# Patient Record
Sex: FEMALE | Race: WHITE | Employment: FULL TIME | URBAN - NONMETROPOLITAN AREA
[De-identification: names, ages, dates, MRNs, and addresses within clinical notes are randomized per-mention and may not be internally consistent; named-entity substitution may affect disease eponyms.]

---

## 2019-12-12 ENCOUNTER — OFFICE VISIT (OUTPATIENT)
Dept: OPTOMETRY | Age: 49
End: 2019-12-12
Payer: COMMERCIAL

## 2019-12-12 DIAGNOSIS — H52.4 MYOPIA OF BOTH EYES WITH ASTIGMATISM AND PRESBYOPIA: Primary | ICD-10-CM

## 2019-12-12 DIAGNOSIS — H52.13 MYOPIA OF BOTH EYES WITH ASTIGMATISM AND PRESBYOPIA: Primary | ICD-10-CM

## 2019-12-12 DIAGNOSIS — H52.203 MYOPIA OF BOTH EYES WITH ASTIGMATISM AND PRESBYOPIA: Primary | ICD-10-CM

## 2019-12-12 PROCEDURE — 92004 COMPRE OPH EXAM NEW PT 1/>: CPT | Performed by: OPTOMETRIST

## 2019-12-12 ASSESSMENT — REFRACTION_MANIFEST
OS_SPHERE: -1.75
OS_AXIS: 091
OD_ADD: +1.75
OS_ADD: +1.75
OS_CYLINDER: -0.50
OD_SPHERE: -1.75
OD_CYLINDER: DS

## 2019-12-12 ASSESSMENT — REFRACTION_WEARINGRX
OS_ADD: +1.50
OD_CYLINDER: DS
OD_ADD: +1.50
OS_SPHERE: -2.25
SPECS_TYPE: PAL
OS_CYLINDER: -0.25
OD_SPHERE: -2.25
OS_AXIS: 103

## 2019-12-12 ASSESSMENT — SLIT LAMP EXAM - LIDS
COMMENTS: NORMAL
COMMENTS: NORMAL

## 2019-12-12 ASSESSMENT — TONOMETRY
OD_IOP_MMHG: 16
OS_IOP_MMHG: 19
IOP_METHOD: NON-CONTACT AIR PUFF

## 2019-12-12 ASSESSMENT — VISUAL ACUITY
OD_CC: 20/40 OU
CORRECTION_TYPE: GLASSES
OS_CC: 20/25
METHOD: SNELLEN - LINEAR

## 2023-02-16 ENCOUNTER — APPOINTMENT (OUTPATIENT)
Dept: GENERAL RADIOLOGY | Age: 53
End: 2023-02-16
Payer: COMMERCIAL

## 2023-02-16 ENCOUNTER — HOSPITAL ENCOUNTER (EMERGENCY)
Age: 53
Discharge: HOME | End: 2023-02-16
Attending: EMERGENCY MEDICINE
Payer: COMMERCIAL

## 2023-02-16 VITALS
OXYGEN SATURATION: 98 % | RESPIRATION RATE: 18 BRPM | SYSTOLIC BLOOD PRESSURE: 145 MMHG | TEMPERATURE: 98 F | DIASTOLIC BLOOD PRESSURE: 107 MMHG | HEART RATE: 97 BPM | HEIGHT: 64 IN | BODY MASS INDEX: 48.14 KG/M2 | WEIGHT: 282 LBS

## 2023-02-16 DIAGNOSIS — R00.0 TACHYCARDIA: Primary | ICD-10-CM

## 2023-02-16 LAB
ALBUMIN SERPL BCG-MCNC: 4.2 G/DL (ref 3.5–5.1)
ALP SERPL-CCNC: 84 U/L (ref 38–126)
ALT SERPL W/O P-5'-P-CCNC: 24 U/L (ref 11–66)
ANION GAP SERPL CALC-SCNC: 11 MEQ/L (ref 8–16)
AST SERPL-CCNC: 18 U/L (ref 5–40)
BASOPHILS ABSOLUTE: 0 THOU/MM3 (ref 0–0.1)
BASOPHILS NFR BLD AUTO: 0.5 %
BILIRUB CONJ SERPL-MCNC: < 0.2 MG/DL (ref 0–0.3)
BILIRUB SERPL-MCNC: 0.4 MG/DL (ref 0.3–1.2)
BUN SERPL-MCNC: 11 MG/DL (ref 7–22)
CALCIUM SERPL-MCNC: 9.2 MG/DL (ref 8.5–10.5)
CHLORIDE SERPL-SCNC: 103 MEQ/L (ref 98–111)
CO2 SERPL-SCNC: 24 MEQ/L (ref 23–33)
CREAT SERPL-MCNC: 0.6 MG/DL (ref 0.4–1.2)
DEPRECATED RDW RBC AUTO: 40.5 FL (ref 35–45)
EOSINOPHIL NFR BLD AUTO: 2.1 %
EOSINOPHILS ABSOLUTE: 0.1 THOU/MM3 (ref 0–0.4)
ERYTHROCYTE [DISTWIDTH] IN BLOOD BY AUTOMATED COUNT: 13.1 % (ref 11.5–14.5)
GFR SERPL CREATININE-BSD FRML MDRD: > 60 ML/MIN/1.73M2
GLUCOSE SERPL-MCNC: 105 MG/DL (ref 70–108)
HCT VFR BLD AUTO: 43.3 % (ref 37–47)
HGB BLD-MCNC: 14.5 GM/DL (ref 12–16)
IMM GRANULOCYTES # BLD AUTO: 0.02 THOU/MM3 (ref 0–0.07)
IMM GRANULOCYTES NFR BLD AUTO: 0.3 %
LIPASE SERPL-CCNC: 20.6 U/L (ref 5.6–51.3)
LYMPHOCYTES ABSOLUTE: 2.3 THOU/MM3 (ref 1–4.8)
LYMPHOCYTES NFR BLD AUTO: 39.6 %
MAGNESIUM SERPL-MCNC: 1.8 MG/DL (ref 1.6–2.4)
MCH RBC QN AUTO: 28.3 PG (ref 26–33)
MCHC RBC AUTO-ENTMCNC: 33.5 GM/DL (ref 32.2–35.5)
MCV RBC AUTO: 84.6 FL (ref 81–99)
MONOCYTES ABSOLUTE: 0.4 THOU/MM3 (ref 0.4–1.3)
MONOCYTES NFR BLD AUTO: 6.2 %
NEUTROPHILS NFR BLD AUTO: 51.3 %
NRBC BLD AUTO-RTO: 0 /100 WBC
NT-PROBNP SERPL IA-MCNC: < 36 PG/ML (ref 0–124)
OSMOLALITY SERPL CALC.SUM OF ELEC: 275.4 MOSMOL/KG (ref 275–300)
PLATELET # BLD AUTO: 264 THOU/MM3 (ref 130–400)
PMV BLD AUTO: 9.5 FL (ref 9.4–12.4)
POTASSIUM SERPL-SCNC: 3.6 MEQ/L (ref 3.5–5.2)
PROT SERPL-MCNC: 7.4 G/DL (ref 6.1–8)
RBC # BLD AUTO: 5.12 MILL/MM3 (ref 4.2–5.4)
SEGMENTED NEUTROPHILS ABSOLUTE COUNT: 3 THOU/MM3 (ref 1.8–7.7)
SODIUM SERPL-SCNC: 138 MEQ/L (ref 135–145)
TROPONIN T: < 0.01 NG/ML
WBC # BLD AUTO: 5.8 THOU/MM3 (ref 4.8–10.8)

## 2023-02-16 PROCEDURE — 83735 ASSAY OF MAGNESIUM: CPT

## 2023-02-16 PROCEDURE — 83880 ASSAY OF NATRIURETIC PEPTIDE: CPT

## 2023-02-16 PROCEDURE — 85025 COMPLETE CBC W/AUTO DIFF WBC: CPT

## 2023-02-16 PROCEDURE — 99285 EMERGENCY DEPT VISIT HI MDM: CPT

## 2023-02-16 PROCEDURE — 93005 ELECTROCARDIOGRAM TRACING: CPT | Performed by: EMERGENCY MEDICINE

## 2023-02-16 PROCEDURE — 83690 ASSAY OF LIPASE: CPT

## 2023-02-16 PROCEDURE — 71045 X-RAY EXAM CHEST 1 VIEW: CPT

## 2023-02-16 PROCEDURE — 84484 ASSAY OF TROPONIN QUANT: CPT

## 2023-02-16 PROCEDURE — 36415 COLL VENOUS BLD VENIPUNCTURE: CPT

## 2023-02-16 PROCEDURE — 80053 COMPREHEN METABOLIC PANEL: CPT

## 2023-02-16 PROCEDURE — 82248 BILIRUBIN DIRECT: CPT

## 2023-02-16 NOTE — ED PROVIDER NOTES
[facility] [address] [address] EMERGENCY DEPT      EMERGENCY MEDICINE     Pt Name: Julia Morales  MRN: [id number]  [place] 1970  Date of evaluation: 2/16/2023  Resident Physician: Cindy Matias DPM  Supervising Physician: Abena Rosales DO    CHIEF COMPLAINT       Chief Complaint   Patient presents with    Palpitations     HISTORY OF PRESENT ILLNESS   Julia Morales is a pleasant 47 y.o. female who presents to the emergency department from from home, as a walk in to the ED lobby for evaluation of heart palpitations. Patient relates that she is had palpitations her whole life without any significant comorbid symptoms and that they would savita after a day or so. She relates that since this past Sunday, approximately 4 days ago, she began having consistent palpitations with severe pain during them. She admits that when they occur she gets slightly dizzy but has not passed out. She denies any nausea, vomiting, consistent chest pain, consistent shortness of breath, diarrhea. She denies any illness in recent memory. She relates that she has not taken anything for this issue nor seen a doctor for it, and has not seen a physician since having COVID 2-3 years ago. 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 PASTMEDICAL HISTORY     Past Medical History:   Diagnosis Date    Myopia        Patient Active Problem List   Diagnosis Code    Myopia of both eyes with astigmatism H52.13, H52.203     SURGICAL HISTORY     History reviewed. No pertinent surgical history. CURRENT MEDICATIONS       Previous Medications    No medications on file       ALLERGIES     is allergic to penicillins. FAMILY HISTORY     She indicated that the status of her neg hx is unknown.        SOCIAL HISTORY       Social History     Tobacco Use    Smoking status: Never    Smokeless tobacco: Never       PHYSICAL EXAM       ED Triage Vitals   BP Temp Temp src Pulse Resp SpO2 Height Weight   -- -- -- -- -- -- -- --       Additional Vital Signs:  Vitals:    02/16/23 1408   BP: (!) 145/107   Pulse: 97   Resp: 18   Temp: 98 °F (36.7 °C)   SpO2: 98%     Physical Exam  Vitals and nursing note reviewed. Constitutional:       General: She is not in acute distress. Appearance: Normal appearance. She is obese. She is not ill-appearing. HENT:      Head: Normocephalic. Eyes:      Extraocular Movements: Extraocular movements intact. Pupils: Pupils are equal, round, and reactive to light. Cardiovascular:      Rate and Rhythm: Regular rhythm. Tachycardia present. Pulses: Normal pulses. Pulmonary:      Effort: Pulmonary effort is normal.      Breath sounds: Normal breath sounds. Abdominal:      Palpations: Abdomen is soft. Tenderness: There is no abdominal tenderness. Musculoskeletal:      Right lower leg: Edema present. Left lower leg: Edema present. Skin:     General: Skin is warm and dry. Capillary Refill: Capillary refill takes less than 2 seconds. Neurological:      General: No focal deficit present. Mental Status: She is alert and oriented to person, place, and time. Psychiatric:         Attention and Perception: Attention normal.         Mood and Affect: Mood is anxious. Speech: Speech normal.         Behavior: Behavior is cooperative. Thought Content: Thought content normal.       FORMAL DIAGNOSTIC RESULTS     RADIOLOGY: Interpretation per the Radiologist below, if available at the time of this note (none if blank):    XR CHEST PORTABLE   Final Result   Normal mobile chest.            **This report has been created using voice recognition software. It may contain minor errors which are inherent in voice recognition technology. **      Final report electronically signed by Dr. Noemi Muñiz on 2/16/2023 2:27 PM          LABS: (none if blank)  Labs Reviewed   CBC WITH AUTO DIFFERENTIAL   BRAIN NATRIURETIC PEPTIDE   BASIC METABOLIC PANEL   HEPATIC FUNCTION PANEL   LIPASE   TROPONIN   MAGNESIUM   ANION GAP   GLOMERULAR FILTRATION RATE, ESTIMATED   OSMOLALITY       (Any cultures that may have been sent were not resulted at the time of this patient visit)    [address] / ED COURSE:     1) Number and Complexity of Problems            Problem List This Visit:         Chief Complaint   Patient presents with    Palpitations            Differential Diagnosis includes (but not limited to): Anxiety induced palpitations,        Diagnoses Considered but I have low suspicion of:   ACS, A-fib, a flutter             Pertinent Comorbid Conditions:        2)  Data Reviewed (none if left blank)          My Independent interpretations:     EKG:      NSR    Imaging: CXR    Labs:      CBC, BMP, BMP, hepatic, lipase, troponin, mag                 Decision Rules/Clinical Scores utilized:              External Documentation Reviewed:         Previous patient encounter documents & history available on EMR was reviewed. See Formal Diagnostic Results above for the lab and radiology tests and orders. 3)  Treatment and Disposition         ED Reassessment: Upon reassessment patient was sitting in cot no acute distress. Had not had ID acute palpitations since initial examination. Did not describe pain as \"chest pain\" but more of a getting the wind knocked out of her. Case discussed with consulting clinician:           Shared Decision-Making was performed and disposition discussed with the        Patient/Family and questions answered          Social determinants of health impacting treatment or disposition:           Code Status:  Not on file. Summary of Patient Presentation:    Medical Decision Making  This 60-year-old female patient presented to the emergency department with chief complaint of palpitations.   She related that she has had palpitations her entire life, and that they would savita after a day or so. She relates that for the past 4 days the episodes are making her feel like she got the wind knocked out of her and that they are not abating. EKG showed normal sinus rhythm. Chest x-ray no acute findings. Auscultation of heart and lungs are unremarkable. Patient sustained a heart rate above 90 for the entire emergency department stay. Patient did relate recent events in her life that could be considered stressors, with consideration given to anxiety. All labs drawn were unremarkable for any acute cardiac findings. Due to this and patient being stable, persistent tachycardia was suspected and she was given a prescription for metoprolol 25 mg p.o. twice daily for 30 days with instructions to follow-up with cardiology (Dr. Abel Cameron) within 2-3 days, as well as follow-up with primary care within 1 week. She may return to the emergency department if symptoms worsen. /  ED Course as of 02/16/23 1519   Thu Feb 16, 2023   1456 Chest x-ray returned; normal chest, no acute findings. [AA]      ED Course User Index  [AA] Amilcar Henry, DPM     Vitals Reviewed:    Vitals:    02/16/23 1408   BP: (!) 145/107   Pulse: 97   Resp: 18   Temp: 98 °F (36.7 °C)   SpO2: 98%   Weight: 282 lb (127.9 kg)   Height: 5' 4\" (1.626 m)       The patient was seen and examined. Appropriate diagnostic testing was performed and results reviewed with the patient. The results of pertinent diagnostic studies and exam findings were discussed. The patients provisional diagnosis and plan of care were discussed with the patient and present family who expressed understanding. Any medications were reviewed and indications and risks of medications were discussed with the patient /family present. Strict verbal and written return precautions, instructions and appropriate follow-up provided to  the patient.      ED Medications administered this visit:  (None if blank)  Medications - No data to display      PROCEDURES: (None if blank)  Procedures:     CRITICAL CARE: (None if blank)      DISCHARGE PRESCRIPTIONS: (None if blank)  New Prescriptions    METOPROLOL TARTRATE (LOPRESSOR) 25 MG TABLET    Take 1 tablet by mouth 2 times daily       FINAL IMPRESSION      1.  Tachycardia          DISPOSITION/PLAN   DISPOSITION Decision To Discharge 02/16/2023 03:12:07 PM        OUTPATIENT FOLLOW UP THE PATIENT:  Mary Ellen Horton MD  [address] [place] [address]  [phone]    Schedule an appointment as soon as possible for a visit in 2 days      [address] [address]  [phone]  Go to   If symptoms worsen    Barry Pepper MD  [address] [address]  [phone]    Schedule an appointment as soon as possible for a visit in 1 week      Saad Ruiz [address] [place] [address]  02/16/23 3801

## 2023-02-16 NOTE — ED TRIAGE NOTES
Patient to room 16 from triage for complaint of worsening palpitations. Patient states that she has been getting these for her whole life. Patient states that she has not followed with cardiology for approximately 20 years. EKG completed, Dr. Ken Pantoja at bedside.

## 2023-02-16 NOTE — DISCHARGE INSTRUCTIONS
You were seen in the emergency department for palpitations. Take all prescribed medications as directed. Contact Dr. Vijaya Palma within the next 2-3 days for follow-up care and management. Return to ED if symptoms worsen.

## 2023-02-17 LAB
EKG ATRIAL RATE: 93 BPM
EKG P AXIS: 68 DEGREES
EKG P-R INTERVAL: 164 MS
EKG Q-T INTERVAL: 366 MS
EKG QRS DURATION: 84 MS
EKG QTC CALCULATION (BAZETT): 455 MS
EKG R AXIS: 0 DEGREES
EKG T AXIS: 31 DEGREES
EKG VENTRICULAR RATE: 93 BPM

## 2023-02-17 PROCEDURE — 93010 ELECTROCARDIOGRAM REPORT: CPT | Performed by: INTERNAL MEDICINE

## 2023-03-17 ENCOUNTER — HOSPITAL ENCOUNTER (OUTPATIENT)
Dept: NON INVASIVE DIAGNOSTICS | Age: 53
Discharge: HOME | End: 2023-03-17
Payer: COMMERCIAL

## 2023-03-17 DIAGNOSIS — R00.2 PALPITATIONS: ICD-10-CM

## 2023-03-17 PROCEDURE — 93225 XTRNL ECG REC<48 HRS REC: CPT

## 2023-03-17 PROCEDURE — 93226 XTRNL ECG REC<48 HR SCAN A/R: CPT

## 2023-03-17 NOTE — PROCEDURES
24 hour Holter Monitor was applied to patient. Patient was instructed to remove monitor on 03/18/2023 at  and return to [facility]. Instructions were given on how to turn monitor off after removal. The serial number on the Holter Monitor is [id number].

## 2023-03-20 ENCOUNTER — HOSPITAL ENCOUNTER (OUTPATIENT)
Age: 53
Discharge: HOME | End: 2023-03-20
Payer: COMMERCIAL

## 2023-03-20 DIAGNOSIS — R00.2 PALPITATIONS: ICD-10-CM

## 2023-03-20 DIAGNOSIS — Z13.6 SCREENING FOR ISCHEMIC HEART DISEASE: ICD-10-CM

## 2023-03-20 DIAGNOSIS — Z00.00 WELLNESS EXAMINATION: ICD-10-CM

## 2023-03-20 LAB
CHOLEST SERPL-MCNC: 194 MG/DL (ref 100–199)
GLUCOSE SERPL-MCNC: 103 MG/DL (ref 70–108)
HDLC SERPL-MCNC: 39 MG/DL
LDLC SERPL CALC-MCNC: 102 MG/DL
T4 FREE SERPL-MCNC: 1.23 NG/DL (ref 0.93–1.76)
TRIGL SERPL-MCNC: 264 MG/DL (ref 0–199)
TSH SERPL DL<=0.005 MIU/L-ACNC: 1.63 UIU/ML (ref 0.4–4.2)

## 2023-03-20 PROCEDURE — 36415 COLL VENOUS BLD VENIPUNCTURE: CPT

## 2023-03-20 PROCEDURE — 84443 ASSAY THYROID STIM HORMONE: CPT

## 2023-03-20 PROCEDURE — 80061 LIPID PANEL: CPT

## 2023-03-20 PROCEDURE — 82947 ASSAY GLUCOSE BLOOD QUANT: CPT

## 2023-03-20 PROCEDURE — 84439 ASSAY OF FREE THYROXINE: CPT

## 2023-03-22 ENCOUNTER — HOSPITAL ENCOUNTER (OUTPATIENT)
Dept: MAMMOGRAPHY | Age: 53
Discharge: HOME | End: 2023-03-22
Payer: COMMERCIAL

## 2023-03-22 ENCOUNTER — OFFICE VISIT (OUTPATIENT)
Dept: BARIATRICS/WEIGHT MGMT | Age: 53
End: 2023-03-22
Payer: COMMERCIAL

## 2023-03-22 VITALS
TEMPERATURE: 97.6 F | DIASTOLIC BLOOD PRESSURE: 82 MMHG | HEIGHT: 64 IN | SYSTOLIC BLOOD PRESSURE: 126 MMHG | BODY MASS INDEX: 50.02 KG/M2 | WEIGHT: 293 LBS | HEART RATE: 68 BPM

## 2023-03-22 DIAGNOSIS — Z12.31 ENCOUNTER FOR SCREENING MAMMOGRAM FOR MALIGNANT NEOPLASM OF BREAST: ICD-10-CM

## 2023-03-22 DIAGNOSIS — R00.2 PALPITATIONS: ICD-10-CM

## 2023-03-22 PROCEDURE — 77063 BREAST TOMOSYNTHESIS BI: CPT

## 2023-03-22 PROCEDURE — 99204 OFFICE O/P NEW MOD 45 MIN: CPT | Performed by: PHYSICIAN ASSISTANT

## 2023-03-22 NOTE — PROGRESS NOTES
to live a more active and social lifestyle      STOP BANG Questionnaire:2          Weight Related Comorbid Conditions:   Significant weight related diseases affecting this patient are Palpitations    Allergies: Allergies   Allergen Reactions    Penicillins Hives       Past Medical History:     Past Medical History:   Diagnosis Date    Arrhythmia     Myopia    . Past Surgical History:  Past Surgical History:   Procedure Laterality Date    APPENDECTOMY       SECTION      TONSILLECTOMY         Family History:  Family History   Problem Relation Age of Onset    Diabetes Father     Breast Cancer Maternal Grandfather 39    Asthma Brother     Cancer Paternal Grandmother     Cancer Maternal Grandmother     Glaucoma Neg Hx     Cataracts Neg Hx        Social History:  Social History     Socioeconomic History    Marital status:      Spouse name: Not on file    Number of children: Not on file    Years of education: Not on file    Highest education level: Not on file   Occupational History     Employer: [employer]   Tobacco Use    Smoking status: Never    Smokeless tobacco: Never   Substance and Sexual Activity    Alcohol use: Not on file    Drug use: Not on file    Sexual activity: Not on file   Other Topics Concern    Not on file   Social History Narrative    Not on file     Social Determinants of Health     Financial Resource Strain: Not on file   Food Insecurity: Not on file   Transportation Needs: Not on file   Physical Activity: Not on file   Stress: Not on file   Social Connections: Not on file   Intimate Partner Violence: Not on file   Housing Stability: Not on file       Current Medications:  Outpatient Medications Marked as Taking for the 3/22/23 encounter (Office Visit) with MAX Hayward   Medication Sig Dispense Refill    metoprolol tartrate (LOPRESSOR) 50 MG tablet Take 1 tablet by mouth 2 times daily 60 tablet 5         24 hour diet recall and physical activity reviewed.     Review of

## 2023-03-27 LAB
ACQUISITION DURATION: NORMAL S
AVERAGE HEART RATE: 73 BPM
HOOKUP DATE: NORMAL
HOOKUP TIME: NORMAL
MAX HEART RATE TIME/DATE: NORMAL
MAX HEART RATE: 108 BPM
MIN HEART RATE TIME/DATE: NORMAL
MIN HEART RATE: 45 BPM
NUMBER OF QRS COMPLEXES: NORMAL
NUMBER OF SUPRAVENTRICULAR COUPLETS: 0
NUMBER OF SUPRAVENTRICULAR ECTOPICS: 97
NUMBER OF SUPRAVENTRICULAR ISOLATED BEATS: 31
NUMBER OF VENTRICULAR BIGEMINAL CYCLES: 0
NUMBER OF VENTRICULAR COUPLETS: 0
NUMBER OF VENTRICULAR ECTOPICS: 1

## 2023-03-29 ENCOUNTER — OFFICE VISIT (OUTPATIENT)
Dept: CARDIOLOGY CLINIC | Age: 53
End: 2023-03-29
Payer: COMMERCIAL

## 2023-03-29 VITALS
HEART RATE: 72 BPM | HEIGHT: 64 IN | DIASTOLIC BLOOD PRESSURE: 90 MMHG | BODY MASS INDEX: 50.02 KG/M2 | SYSTOLIC BLOOD PRESSURE: 138 MMHG | WEIGHT: 293 LBS

## 2023-03-29 DIAGNOSIS — R00.2 PALPITATION: Primary | ICD-10-CM

## 2023-03-29 DIAGNOSIS — G47.30 SLEEP APNEA IN ADULT: ICD-10-CM

## 2023-03-29 PROCEDURE — 99204 OFFICE O/P NEW MOD 45 MIN: CPT | Performed by: INTERNAL MEDICINE

## 2023-03-29 NOTE — PROGRESS NOTES
New patient here for check up - monitor    Pt continues with heart palpitations, went to ED 2/2023 due to more often, only noticed 4 times since ED visit, sob, swelling in legs and feet,
at 8:42 AM EDT

## 2023-04-04 ENCOUNTER — HOSPITAL ENCOUNTER (OUTPATIENT)
Dept: NON INVASIVE DIAGNOSTICS | Age: 53
Discharge: HOME | End: 2023-04-04
Payer: COMMERCIAL

## 2023-04-04 DIAGNOSIS — R00.2 PALPITATION: ICD-10-CM

## 2023-04-04 LAB
LV EF: 53 %
LVEF MODALITY: NORMAL

## 2023-04-04 PROCEDURE — 93306 TTE W/DOPPLER COMPLETE: CPT

## 2023-04-04 NOTE — PROGRESS NOTES
Jessie Ledbetter is a 47 y.o. female with a date of birth of 1970. Patient is a 47 y.o. female seen for initial MNT visit for medically supervised weight loss. Vitals from current and previous visits:  Vitals 1/32/3354   SYSTOLIC 832   DIASTOLIC 82   Site Right Upper Arm   Position Sitting   Cuff Size Large Adult   Pulse 68   Temp 97.6   Resp    SpO2    Weight 300 lb 6.4 oz   Height 5' 4\"   Body mass index 51.56 kg/m2   Waist (Inches) 55.5     Vitals 5/8/6074   SYSTOLIC    DIASTOLIC    Site    Position    Cuff Size    Pulse    Temp    Resp    SpO2    Weight 294 lb 9.6 oz   Height 5' 4\"   Body mass index 50.56 kg/m2   Waist (Inches)           Vitals:    04/06/23 0848   Weight: 294 lb 9.6 oz (133.6 kg)   Height: 5' 4\" (1.626 m)    BMI: Body mass index is 50.57 kg/m². Obesity Classification: Class III    Weight History: Wt Readings from Last 3 Encounters:   04/06/23 294 lb 9.6 oz (133.6 kg)   03/29/23 (!) 300 lb 8 oz (136.3 kg)   03/22/23 (!) 300 lb 6.4 oz (136.3 kg)     Recent labs- TG-264, glucose-103, TSH WNL    Describe your current weight: Pt concerned with medical problems in future. Desires to start losing weight and states in past wasn't taking it seriously and is now looking for more accountability. Patient's lowest adult weight was 150 lb at age 26. The lowest weight was achieved through younger age, more active. Patient was at her lowest weight for 10 years. After last child at age ~32 was around 180 lbs  Reports after having children began gaining more weight gradually over the years  In 2012 lost 60 lbs at a bariatric/weight management facility and kept off for 3 years  At that time was also taking weight loss medications    Patient's highest adult weight was 300 lb at age 47. Patient was at her highest weight for 6 months.         Patient has participated in the following weight loss programs: [employer] and [employer], [facility]     Patient has participated

## 2023-04-06 ENCOUNTER — OFFICE VISIT (OUTPATIENT)
Dept: BARIATRICS/WEIGHT MGMT | Age: 53
End: 2023-04-06

## 2023-04-06 VITALS — WEIGHT: 293 LBS | BODY MASS INDEX: 50.02 KG/M2 | HEIGHT: 64 IN

## 2023-04-06 NOTE — PATIENT INSTRUCTIONS
Goals:  Nutrition Goal:  I will use my food journal to record meal times, serving sizes and bring back to next dietitian visit  Water Goal:  Continue water intake at least two gallons per day  Exercise Goal: I will walk outside at least 4-5 times a week for ~ 20-30 minutes

## 2023-04-20 ENCOUNTER — OFFICE VISIT (OUTPATIENT)
Dept: BARIATRICS/WEIGHT MGMT | Age: 53
End: 2023-04-20
Payer: COMMERCIAL

## 2023-04-20 VITALS
TEMPERATURE: 98 F | WEIGHT: 289.8 LBS | BODY MASS INDEX: 49.47 KG/M2 | SYSTOLIC BLOOD PRESSURE: 118 MMHG | HEIGHT: 64 IN | HEART RATE: 68 BPM | DIASTOLIC BLOOD PRESSURE: 86 MMHG

## 2023-04-20 DIAGNOSIS — R00.2 PALPITATIONS: ICD-10-CM

## 2023-04-20 DIAGNOSIS — I34.1 MVP (MITRAL VALVE PROLAPSE): ICD-10-CM

## 2023-04-20 PROCEDURE — 99213 OFFICE O/P EST LOW 20 MIN: CPT | Performed by: PHYSICIAN ASSISTANT

## 2023-04-20 NOTE — PROGRESS NOTES
[address] 10/20/2012 08:44 AM        TSH   Lab Results   Component Value Date/Time    TSH 1.630 03/20/2023 05:18 AM        IRON   No results found for: IRON     TIBC  No results found for: TIBC    FERRITIN  No results found for: FERRITIN    VITAMIN A  No results found for: RETINOL    NICOTINE  No results found for: NMET    UDS  No results found for: UDP    PSA  No results found for: LABPSA    GFR  Lab Results   Component Value Date/Time    LABGLOM >60 02/16/2023 02:27 PM       DEXA  No results found for this or any previous visit. Assessment:       Diagnosis Orders   1. BMI 45.0-49.9, adult (HCC)        2. Palpitations        3. MVP (mitral valve prolapse)            Plan:    Behavior modification discussed in detail in regards to dietary habits. Nutritional education occurred during visit. Continue following recommendations  per dietitian. Improvement in fitness/exercise discussed with patient and the need for this  with/without surgery. EGD prior to any surgical intervention  Encouraged to attend support groups. Goal to lose 1-2# per week  Seca scale completed and reviewed. Discussed Kelly Cure- will consider at a later date. To follow up with the dietitian in 2 weeks  Return in about 1 month (around 5/20/2023) for Follow up. I spent over 20 minutes with the patient, with greater that 50% of that time spent on education, counseling and coordination of care.      Electronically signed by MAX Martinez on 4/20/2023 at 3:01 PM

## 2023-04-20 NOTE — PATIENT INSTRUCTIONS
Behavior modification discussed in detail in regards to dietary habits. Nutritional education occurred during visit. Continue following recommendations  per dietitian. Improvement in fitness/exercise discussed with patient and the need for this  with/without surgery. EGD prior to any surgical intervention  Encouraged to attend support groups. Goal to lose 1-2# per week  Seca scale completed and reviewed. Discussed Nirali Frausto- will consider at a later date.   To follow up with the dietitian in 2 weeks

## 2023-05-03 NOTE — PROGRESS NOTES
Assessment: Patient is a 47 y.o. female seen for   month one  follow up MNT visit for  medically supervised weight loss    Vitals from current and previous visits:  Vitals 7/5/1878   SYSTOLIC    DIASTOLIC    Site    Position    Cuff Size    Pulse    Temp    Resp    SpO2    Weight 287 lb 9.6 oz   Height 5' 4\"   Body Mass Index 49.37 kg/m2   Waist (Inches)        Initial weight at start of Weight Management Program was: 300 lbs     Shana lost 7 lbs over one month from last RD visit  -Weight goal: lose weight.     -Nutritionally relevant labs: TG-264, glucose-103, TSH WNL  Lab Results   Component Value Date/Time    LABA1C 5.4 10/20/2012 08:44 AM    GLUCOSE 103 03/20/2023 05:18 AM    GLUCOSE 105 02/16/2023 02:27 PM    CHOL 194 03/20/2023 05:18 AM    HDL 39 03/20/2023 05:18 AM    LDLCALC 102 03/20/2023 05:18 AM    TRIG 264 (H) 03/20/2023 05:18 AM                  Lab Results   Component Value Date/Time    VITD25 19 10/20/2012 08:44 AM         - Is patient taking daily Multivitamin:  Does not take a MVI      Work and Sleep Schedule: Works M-F 6a-4:30pm  Pt, spouse and mother n law- 80year old in house     -Food Recall:   Pt had food journal in office to review. Also started using smaller plates at dinner  Breakfast: 6am- oatmeal with blueberries, 2 ham slices and 4 cups. Lunch: 12:30p- cauliflower pizza two slices, salad with cucumber, celery and mustard or meatloaf one slice, asparagus and orange  Dinner: 5:30p- shrimp scampi, broccoli, biscuit times one or turkey wrap with cabbage with cheese and cucumbers  Snacks: cucumber and cheese slice or cottage cheese whip or grapes and green pepper   Main Beverages:  down to 2-3 coffee with sugar free creamer, water -started 4 oz every 30 minutes- total of ~ 2 gallons water. No pop, no energy drinks. Whole milk on occasion (Sunday mornings) No juice  Do you drink alcohol? No.     -Impression of Dietary Intake:  increased vegetable intake, smaller portions  .  - Pt  is Pap every 3 years if normal, stting at age 25   STI testing as indicated, exercise most days of week, obtain appropriate diet and hydration, Calcium 1000mg + 600 vit D daily, birth control as directed (ACHES reviewed)  Benefits, risks and alternatives discussed/reviewed  Condom use when sexually active for sexually transmitted infection prevention  HPV 9 vaccine recommended through age 39  Check with your insurance for coverage  If covered, call office to schedule start of vaccine series  Annual mammogram starting at age 36, monthly breast self exam  Francisca De Anda   at red light or at least  20 times a day

## 2023-05-05 ENCOUNTER — OFFICE VISIT (OUTPATIENT)
Dept: BARIATRICS/WEIGHT MGMT | Age: 53
End: 2023-05-05

## 2023-05-05 VITALS — BODY MASS INDEX: 49.1 KG/M2 | WEIGHT: 287.6 LBS | HEIGHT: 64 IN

## 2023-05-05 DIAGNOSIS — E66.01 MORBID OBESITY WITH BMI OF 45.0-49.9, ADULT (HCC): Primary | ICD-10-CM

## 2023-05-22 NOTE — PROGRESS NOTES
Assessment: Patient is a 47 y.o. female seen for  month two  follow up MNT visit for  medically supervised weight loss    Vitals from current and previous visits:    Vitals 9/77/2254   SYSTOLIC    DIASTOLIC    Site    Position    Cuff Size    Pulse    Temp    Resp    SpO2    Weight 283 lb 6.4 oz   Height 5' 4\"   Body Mass Index 48.65 kg/m2   Waist (Inches)        Initial weight at start of Weight Management Program was: 300 lbs     Shana lost 4 lbs over one month  -Weight goal: lose weight.     -Nutritionally relevant labs: TG-264, glucose-103, TSH WNL  Lab Results   Component Value Date/Time    LABA1C 5.4 10/20/2012 08:44 AM    GLUCOSE 103 03/20/2023 05:18 AM    GLUCOSE 105 02/16/2023 02:27 PM    CHOL 194 03/20/2023 05:18 AM    HDL 39 03/20/2023 05:18 AM    LDLCALC 102 03/20/2023 05:18 AM    TRIG 264 (H) 03/20/2023 05:18 AM                  Lab Results   Component Value Date/Time    VITD25 19 10/20/2012 08:44 AM     ISA consult 5/23/23- sleep study scheduled July 16th    - Is patient taking daily Multivitamin:  Does not take a MVI      Work and Sleep Schedule: Works M-F 6a-4:30pm- works from home  Pt, spouse and mother n law- 80year old in [address] [place] [address] Recall:   Pt had food journal in office to review. Continues using smaller plates and dishes at dinner. Denies hunger  Breakfast: 6am- oatmeal with blueberries,- adding protein powder unflavored Isopure ~ 20 grams protein per scoop or has a smoothie  Lunch: 12:30p- packs foods when traveling for work and brings [employer] Verbal pot  Dinner: 5:30p- beef and noodles, asparagus and green beans. Likes vegetables. Lasagna and salad  Snacks:  in evening may have a snack- apples and peanut butter (single serving), or celery  Main Beverages:   2-3  cups coffee with sugar free creamer, water -started 4 oz every 30 minutes- total of ~ 2 gallons water or more  No pop, no energy drinks. Whole milk on occasion (Sunday mornings) - one cup only  Do you drink alcohol?  No.

## 2023-05-23 ENCOUNTER — OFFICE VISIT (OUTPATIENT)
Dept: BARIATRICS/WEIGHT MGMT | Age: 53
End: 2023-05-23

## 2023-05-23 ENCOUNTER — OFFICE VISIT (OUTPATIENT)
Dept: PULMONOLOGY | Age: 53
End: 2023-05-23
Payer: COMMERCIAL

## 2023-05-23 VITALS — WEIGHT: 283.4 LBS | HEIGHT: 64 IN | BODY MASS INDEX: 48.38 KG/M2

## 2023-05-23 VITALS
DIASTOLIC BLOOD PRESSURE: 78 MMHG | WEIGHT: 286 LBS | SYSTOLIC BLOOD PRESSURE: 124 MMHG | TEMPERATURE: 97.3 F | HEIGHT: 64 IN | HEART RATE: 72 BPM | BODY MASS INDEX: 48.83 KG/M2 | OXYGEN SATURATION: 99 %

## 2023-05-23 DIAGNOSIS — G47.00 INSOMNIA, UNSPECIFIED TYPE: ICD-10-CM

## 2023-05-23 DIAGNOSIS — G47.33 OSA (OBSTRUCTIVE SLEEP APNEA): Primary | ICD-10-CM

## 2023-05-23 DIAGNOSIS — E66.01 MORBID OBESITY WITH BMI OF 45.0-49.9, ADULT (HCC): ICD-10-CM

## 2023-05-23 DIAGNOSIS — E66.01 MORBID OBESITY WITH BMI OF 45.0-49.9, ADULT (HCC): Primary | ICD-10-CM

## 2023-05-23 DIAGNOSIS — G25.81 RESTLESS LEGS SYNDROME (RLS): ICD-10-CM

## 2023-05-23 PROCEDURE — 99204 OFFICE O/P NEW MOD 45 MIN: CPT | Performed by: SPECIALIST

## 2023-05-23 NOTE — PROGRESS NOTES
New Sleep Patient H/P    Presentation:  Syeda Lacy is referred by Ivonne Mcgee MD  for    Chief Complaint   Patient presents with    Consultation     New sleep consult, no prior studies ref by Dr Rose Mcdermott     LFTs evaluated by cardiologist and had the Holter monitor and during that work-up found that patient had significant bradycardia and other related symptoms hence the patient is referred to the sleep center for further evaluation and management. Patient has history of heart palpitation and mitral valve prolapse for which she is taking medications and following closely with the cardiologist.    Time in Bed:   Bedtime: 9:30 PM  Awakens 5 AM  Different on weekends? Same     Shana falls asleep in about 2 hours. Any awakenings? 4-5 times  Previous evaluation and treatment? No.  Patient has been having the difficulty falling asleep and staying asleep with unpleasant sensation in the legs and has been having the chronic insufficient sleep. She denies any history of sleep walking or sleep talking. No history of seizures activity. No history suggestive of restless legs syndrome. No history of bruxism. No history of head injury. Naps:  Any naps? Not on regular basis  Snoring and Apneas:  Do you snore or been told you a snore? Yes  How long have known about your snoring? For long time  Any witnessed apneas? Unknown  Any awakenings with choking or gasping? Unknown    Dreams:  Any recurring dreams? Denied  Hallucinations? No  Sleep Paralysis? No no  Symptoms of Cataplexy? No    Driving History:  Do you have a CDL or drive long distances for work? Not applicable  Any driving accidents in the past year? Denied  Any sleepiness while driving? Denied    Weight:  Any change in weight over the past year?   In weight loss program for 16 weeks and patient lost about 10 pounds  Past Medical History:   Diagnosis Date    Arrhythmia     Myopia        Past Surgical History:

## 2023-05-23 NOTE — PATIENT INSTRUCTIONS
Goals:  Great job on changes made and portion sizes!   Nutrition Goal:  I will continue to  use my food journal to record meal times, serving sizes and bring back to next dietitian visit  Water Goal:  Continue water intake at least two gallons per day  Exercise Goal: I will make sure riding bike outside at least 30 minutes for two miles

## 2023-07-12 NOTE — PROGRESS NOTES
Assessment: Patient is a 47 y.o. female seen for  month three  follow up MNT visit for  medically supervised weight loss    Vitals from current and previous visits:      Vitals 7/51/6056   SYSTOLIC    DIASTOLIC    Site    Position    Cuff Size    Pulse    Temp    Resp    SpO2    Weight 281 lb   Height 5' 4\"   Body Mass Index 48.23 kg/m2   Waist (Inches)        Initial weight at start of Weight Management Program was: 300 lbs     Shana lost 2 lbs from last RD visit. Total weight loss of 19 lbs = 6.3% weight loss  -Weight goal: lose weight.     -Nutritionally relevant labs: TG-264, glucose-103, TSH WNL  Lab Results   Component Value Date/Time    LABA1C 5.4 10/20/2012 08:44 AM    GLUCOSE 103 03/20/2023 05:18 AM    GLUCOSE 105 02/16/2023 02:27 PM    CHOL 194 03/20/2023 05:18 AM    HDL 39 03/20/2023 05:18 AM    LDLCALC 102 03/20/2023 05:18 AM    TRIG 264 (H) 03/20/2023 05:18 AM                  Lab Results   Component Value Date/Time    VITD25 19 10/20/2012 08:44 AM     sleep study scheduled July 16th    - Is patient taking daily Multivitamin:  Does not take a MVI      Work and Sleep Schedule: Works M-F 6a-4:30pm- works from home  Pt, spouse and mother n law- 80year old in [address] [place] [address] Recall:   Pt had food journal in office to review. Meal times off while on vacation at [address] [place] [address] for three weeks. Just returning to work this week. Breakfast: 6am- two slice of Georgian toast, sausage links, 1 cup orange and raspberries .  one wheat toast and two cuties  Lunch: 12:30p-bbq beef, cheesy potatoes and one cup mixed fruit or tuna salad in lettuce wraps with provolone cheese, medium apple with peanut butter  Dinner: 5:30p-  spaghetti, meatballs one cup of cottage cheese or beef and noodles and mashed potatoes 1/2 cup  Snacks:  in evening may have a snack- apples and peanut butter (single serving), or celery  Main Beverages:   2-3  cups coffee with sugar free creamer, water -~ 2 gallons water or more  No pop, no energy

## 2023-07-13 ENCOUNTER — OFFICE VISIT (OUTPATIENT)
Dept: BARIATRICS/WEIGHT MGMT | Age: 53
End: 2023-07-13

## 2023-07-13 VITALS — BODY MASS INDEX: 47.97 KG/M2 | HEIGHT: 64 IN | WEIGHT: 281 LBS

## 2023-07-13 DIAGNOSIS — E66.01 MORBID OBESITY WITH BMI OF 45.0-49.9, ADULT (HCC): Primary | ICD-10-CM

## 2023-07-13 NOTE — PATIENT INSTRUCTIONS
Goals:    Nutrition Goal:  I will continue to  use my food journal to record meal times, serving sizes and bring back to next dietitian visit. This keeps you accountable!   Water Goal:  Continue water intake at least two gallons per day  Exercise Goal:   Get back to riding bike outside at least 30 minutes for two miles - goal is four days a week

## 2023-07-16 ENCOUNTER — HOSPITAL ENCOUNTER (OUTPATIENT)
Dept: SLEEP CENTER | Age: 53
Discharge: HOME | End: 2023-07-18
Payer: COMMERCIAL

## 2023-07-16 DIAGNOSIS — G47.00 INSOMNIA, UNSPECIFIED TYPE: ICD-10-CM

## 2023-07-16 DIAGNOSIS — G47.33 OSA (OBSTRUCTIVE SLEEP APNEA): ICD-10-CM

## 2023-07-16 DIAGNOSIS — G25.81 RESTLESS LEGS SYNDROME (RLS): ICD-10-CM

## 2023-07-16 DIAGNOSIS — E66.01 MORBID OBESITY WITH BMI OF 45.0-49.9, ADULT (HCC): ICD-10-CM

## 2023-07-16 PROCEDURE — 95810 POLYSOM 6/> YRS 4/> PARAM: CPT

## 2023-07-17 LAB — STATUS: NORMAL

## 2023-07-18 NOTE — PROGRESS NOTES
[place] [address]                               SLEEP STUDY REPORT    PATIENT NAME: Fei Braswell                   :        1970  MED REC NO:   [id number]                           ROOM:  ACCOUNT NO:   [de-identified]                           ADMIT DATE: 2023  PROVIDER:     Rhona Rodas. MD Leo    DATE OF STUDY:  2023    REFERRING PROVIDER:  Derrell Kirkland MD    The patient's height is 64 inches, weight is 286 pounds with a BMI of  49.1. HISTORY:  The patient is a 52-year-old female who was initially  evaluated by Derrell Kirkland MD, on 2023. The patient was found  to have significant bradycardia during his Holter monitoring. The  patient had a history of mitral valve prolapse. The patient had class  IV Mallampati airway. The patient is scheduled for split-night sleep  study to diagnose and treat sleep apnea. The patient however did not  qualify for split-night sleep study and underwent only baseline sleep  study. METHODS:  The patient underwent digital polysomnography in compliance  with the standards and specifications from the AASM Manual including the  simultaneous recording of 3 EEG channels (F4-M1, C4-M1, and O2-M1 with  back up electrodes F3-M2, C3-M2, and O1-M2), 2 EOG channels (E1-M2, and  E2-M1,), EMG (chin, left & right leg), EKG, Nonin pulse oximetry with   less than 2 second averaging time, body position, airflow recorded by  oral-nasal thermal sensor and nasal air pressure transducer, plus  respiratory effort recorded by calibrated respiratory inductance  plethysmography (RIP), flow volume loop, sound and video. Sleep staging  and scoring followed the standard put forth by the American Academy of  Sleep Medicine and utilized the 1B obstructive hypopnea event  desaturation of 4 percent or greater.     INTERPRETATION:  This is a baseline sleep study and the study

## 2023-07-19 ENCOUNTER — TELEPHONE (OUTPATIENT)
Dept: SLEEP CENTER | Age: 53
End: 2023-07-19

## 2023-07-19 NOTE — TELEPHONE ENCOUNTER
The following is Dr. Timmy Miller recommendations after Shana's PSG:    RECOMMENDATIONS:  The patient should be scheduled for a followup with  Viri Archer MD's clinic as soon as possible to discuss about the  sleep study findings for further management. Please move forward her follow-up appointment if possible.

## 2023-07-20 ENCOUNTER — OFFICE VISIT (OUTPATIENT)
Dept: PULMONOLOGY | Age: 53
End: 2023-07-20
Payer: COMMERCIAL

## 2023-07-20 VITALS
TEMPERATURE: 97.8 F | BODY MASS INDEX: 48.38 KG/M2 | HEART RATE: 79 BPM | SYSTOLIC BLOOD PRESSURE: 130 MMHG | HEIGHT: 64 IN | DIASTOLIC BLOOD PRESSURE: 78 MMHG | OXYGEN SATURATION: 99 % | WEIGHT: 283.4 LBS

## 2023-07-20 DIAGNOSIS — G47.33 OSA (OBSTRUCTIVE SLEEP APNEA): Primary | ICD-10-CM

## 2023-07-20 DIAGNOSIS — E66.01 MORBID OBESITY WITH BMI OF 45.0-49.9, ADULT (HCC): ICD-10-CM

## 2023-07-20 DIAGNOSIS — G47.00 INSOMNIA, UNSPECIFIED TYPE: ICD-10-CM

## 2023-07-20 PROCEDURE — 99213 OFFICE O/P EST LOW 20 MIN: CPT | Performed by: SPECIALIST

## 2023-08-10 ENCOUNTER — OFFICE VISIT (OUTPATIENT)
Dept: BARIATRICS/WEIGHT MGMT | Age: 53
End: 2023-08-10
Payer: COMMERCIAL

## 2023-08-10 VITALS
TEMPERATURE: 97.5 F | SYSTOLIC BLOOD PRESSURE: 126 MMHG | DIASTOLIC BLOOD PRESSURE: 88 MMHG | WEIGHT: 278 LBS | HEART RATE: 68 BPM | HEIGHT: 64 IN | BODY MASS INDEX: 47.46 KG/M2

## 2023-08-10 DIAGNOSIS — G47.33 OSA (OBSTRUCTIVE SLEEP APNEA): ICD-10-CM

## 2023-08-10 DIAGNOSIS — I34.1 MVP (MITRAL VALVE PROLAPSE): ICD-10-CM

## 2023-08-10 DIAGNOSIS — R00.2 PALPITATIONS: ICD-10-CM

## 2023-08-10 PROCEDURE — 99213 OFFICE O/P EST LOW 20 MIN: CPT | Performed by: PHYSICIAN ASSISTANT

## 2023-08-10 NOTE — PATIENT INSTRUCTIONS
Behavior modification discussed in detail in regards to dietary habits. Nutritional education occurred during visit. Continue following recommendations  per dietitian. Improvement in fitness/exercise discussed with patient and the need for this  with/without surgery. EGD prior to any surgical intervention  Encouraged to attend support groups. Goal to lose 1-2# per week  Seca scale completed and reviewed. Continue tracking food intake  Encouraged 3 meals daily  Increase dedicated activity to at least 3 times per week. Discussed Luiz Naylor- will consider at a later date. Would like to continue with medical supervision- will schedule apt with the dietitian in 1 month.

## 2023-09-08 NOTE — PROGRESS NOTES
choices and increase eating out. Has had to take a nap in afternoon  Patient desires to get back to improved meal planning and not skipping meals. Main Beverages:   2-3  cups coffee with sugar free creamer, water -~ 2 gallons water or more  No pop, no energy drinks. Whole milk on occasion (Sunday mornings) - one cup only  Do you drink alcohol? No.     -Impression of Dietary Intake:  requires to focus on balanced meals, portion sizes, and  fruits and vegetables- Pt  is working towards avoiding high fat/high sugar foods. Pt  is working towards  including protein at meals and snacks. Exercise:  -Physical Activity is: Has not been exercising last three weeks. Discussed benefits of increased physical activity and new exercise goal set with patient today. Nutrition Diagnosis: Overweight/Obesity related to currently undergoing MNT as evidenced by BMI of 48.4    Intervention:   Healthy behaviors: adequate fluid intake  Reviewed with patient today returning to basics to assist with weight loss efforts including food journaling to keep self accountable with actual food choices. Plans to plan out meals again and make grocery list.  New goals reviewed with patient. Positive reinforcement given to patient to move forward with weight loss journey. Patient Instructions   Goals:  Make your grocery list out for Wednesday to start meal planning  Get back to using food journal to record meal times, serving sizes and bring back to next dietitian  Aim for regular meal times. Breakfast- 6am.  Lunch -12-1pm   Dinner- 5:30p-6:30p   Snack- 9pm when needed  Continue at least 64 oz of water a day greater- good job with this!   Exercise:  Ride bike outside at least three days a week (at least one day on the weekend)      Angela Navarrete, JORGE, LD,   Dietitian- Weight Management [id number] [facility]

## 2023-09-11 ENCOUNTER — OFFICE VISIT (OUTPATIENT)
Dept: BARIATRICS/WEIGHT MGMT | Age: 53
End: 2023-09-11

## 2023-09-11 VITALS — WEIGHT: 282 LBS | HEIGHT: 64 IN | BODY MASS INDEX: 48.14 KG/M2

## 2023-09-11 NOTE — PATIENT INSTRUCTIONS
Goals: Make your grocery list out for Wednesday to start meal planning  Get back to using food journal to record meal times, serving sizes and bring back to next dietitian  Aim for regular meal times. Breakfast- 6am.  Lunch -12-1pm   Dinner- 5:30p-6:30p   Snack- 9pm when needed  Continue at least 64 oz of water a day greater- good job with this!   Exercise:  Ride bike outside at least three days a week (at least one day on the weekend)

## 2023-10-06 ENCOUNTER — OFFICE VISIT (OUTPATIENT)
Dept: PULMONOLOGY | Age: 53
End: 2023-10-06
Payer: COMMERCIAL

## 2023-10-06 VITALS
HEART RATE: 65 BPM | TEMPERATURE: 97.7 F | BODY MASS INDEX: 48.65 KG/M2 | DIASTOLIC BLOOD PRESSURE: 72 MMHG | SYSTOLIC BLOOD PRESSURE: 139 MMHG | OXYGEN SATURATION: 96 % | WEIGHT: 285 LBS | HEIGHT: 64 IN

## 2023-10-06 DIAGNOSIS — E66.01 MORBID OBESITY WITH BMI OF 45.0-49.9, ADULT (HCC): ICD-10-CM

## 2023-10-06 DIAGNOSIS — G47.33 OSA ON CPAP: Primary | ICD-10-CM

## 2023-10-06 PROCEDURE — 99214 OFFICE O/P EST MOD 30 MIN: CPT | Performed by: NURSE PRACTITIONER

## 2023-10-06 ASSESSMENT — ENCOUNTER SYMPTOMS
COUGH: 0
WHEEZING: 0
ALLERGIC/IMMUNOLOGIC NEGATIVE: 1
SHORTNESS OF BREATH: 0
GASTROINTESTINAL NEGATIVE: 1
EYES NEGATIVE: 1
RESPIRATORY NEGATIVE: 1

## 2023-10-06 NOTE — PROGRESS NOTES
equipment today? No  -Download reviewed and discussed with Man Burgess and myself today in the office   -The symptoms of ISA have improved. The patient is benefiting from therapy and should continue use of PAP device. I have reviewed the download. -Patient's symptoms and AHI are controlled with current settings of 5-15 cmH2O.  -Advised to continue current positive airway pressure therapy with above described pressure. -Advised to keep good compliance with current recommended pressure to get optimal results and clinical improvement  -Recommend 7-9 hours of sleep with PAP  -Instructed to call [employer] company regarding supplies if needed.   -Patient to call my office for earlier appointment if needed for worsening of sleep symptoms.   -Patient is not to drive if feeling sleepy  -Discussed weight loss and reaching out and continued compliancy with [facility] [address] [facility] about my impression and plan. Patient verbalizes understanding. Will see Gary Gonzalez back in: 6 months with download. Information added by my medical assistant/LPN was reviewed today.     Electronically signed by YULY Lew CNP on 10/6/2023 at 10:08 AM

## 2023-10-10 ENCOUNTER — OFFICE VISIT (OUTPATIENT)
Dept: BARIATRICS/WEIGHT MGMT | Age: 53
End: 2023-10-10
Payer: COMMERCIAL

## 2023-10-10 VITALS
WEIGHT: 283 LBS | HEIGHT: 64 IN | DIASTOLIC BLOOD PRESSURE: 86 MMHG | TEMPERATURE: 97.2 F | SYSTOLIC BLOOD PRESSURE: 128 MMHG | BODY MASS INDEX: 48.32 KG/M2 | HEART RATE: 60 BPM

## 2023-10-10 DIAGNOSIS — G47.33 OSA ON CPAP: ICD-10-CM

## 2023-10-10 DIAGNOSIS — I34.1 MVP (MITRAL VALVE PROLAPSE): ICD-10-CM

## 2023-10-10 DIAGNOSIS — R00.2 PALPITATIONS: ICD-10-CM

## 2023-10-10 PROCEDURE — 99214 OFFICE O/P EST MOD 30 MIN: CPT | Performed by: PHYSICIAN ASSISTANT

## 2023-10-10 NOTE — PATIENT INSTRUCTIONS
Behavior modification discussed in detail in regards to dietary habits. Nutritional education occurred during visit. Continue following recommendations  per dietitian. Improvement in fitness/exercise discussed with patient and the need for this  with/without surgery. EGD prior to any surgical intervention  Encouraged to attend support groups. Goal to lose 1-2# per week  Seca scale next month  Continue tracking food intake  Encouraged 3 meals daily and 1 healthy snack in the evening. Avoid trigger foods. Encouraged kitchen clean out. Decrease coffee intake to 1 cup daily  Increase dedicated activity to at least 3 times per week. ( Chair exercises given)  Will follow up with Dr. Jackson Redding next month to discuss Adipex/Qsymia.

## 2023-10-24 NOTE — PROGRESS NOTES
Assessment: Patient is a 47 y.o. female seen for  month two  follow up MNT visit for  medically supervised weight loss. Pt has renewed additional three months of medically supervised weight loss    Vitals from current and previous visits:     10/27/2023   Vitals    SYSTOLIC    DIASTOLIC    Site    Position    Cuff Size    Pulse    Temp    Respirations    SpO2    Weight - Scale 286 lb 3.2 oz    Height 5' 4\"    Body Mass Index 49.13 kg/m2 (H)    Pain Level    Waist (Inches)    Neck circumference (Inches)       Initial weight at start of Weight Management Program was: 300 lbs     Shana gained 4 lbs from last RD visit  -Weight goal: lose weight.     -Nutritionally relevant labs: TG-264, glucose-103, TSH WNL  Lab Results   Component Value Date/Time    LABA1C 5.4 10/20/2012 08:44 AM    GLUCOSE 103 03/20/2023 05:18 AM    GLUCOSE 105 02/16/2023 02:27 PM    CHOL 194 03/20/2023 05:18 AM    HDL 39 03/20/2023 05:18 AM    LDLCALC 102 03/20/2023 05:18 AM    TRIG 264 (H) 03/20/2023 05:18 AM                  Lab Results   Component Value Date/Time    VITD25 19 10/20/2012 08:44 AM     Wearing CPAP at 100% per pulmonary notes. - Is patient taking daily Multivitamin:  Does not take a MVI      Work and Sleep Schedule: Works M-F 6a-4:30pm- works from home  Pt, spouse and daughter/boyfriend in [address] [facility] Recall:   Pt desires to try using a phone lionel to visually see calories consumed as thinks this may help patient better . Has noticed sometimes lately eating second helpings. Also desires to plan better with meals  Eating three times a day. Does have a snack in evening and prefers to have this most days. May have ~ 2 cups popcorn  Main Beverages:   down to  1 cup coffee with sugar free creamer instead of 2-3 cups, water -~ 2 gallons water or more  No pop, no energy drinks. Whole milk on occasion (Sunday mornings) - one cup only  Do you drink alcohol?  No.     -Impression of Dietary Intake:  requires to focus on balanced

## 2023-10-27 ENCOUNTER — OFFICE VISIT (OUTPATIENT)
Dept: BARIATRICS/WEIGHT MGMT | Age: 53
End: 2023-10-27

## 2023-10-27 VITALS — WEIGHT: 286.2 LBS | BODY MASS INDEX: 48.86 KG/M2 | HEIGHT: 64 IN

## 2023-10-27 DIAGNOSIS — E66.01 MORBID OBESITY WITH BMI OF 45.0-49.9, ADULT (HCC): Primary | ICD-10-CM

## 2023-10-27 NOTE — PATIENT INSTRUCTIONS
Goals: Focus on balance of the plate at meal times- include lean protein, fruits and vegetables  Aim for regular meal times. Breakfast- 6am.  Lunch -12-1pm   Dinner- 5:30p-6:30p   Snack- 9pm when needed  Continue at least 64 oz of water a day greater- good job with this! My Fitness Pal for food logging lionel. Suggest ~ 1600 calories daily, 45% carbohydrate, 25% protein, and 30% fat  5.   Exercise goal:  Continue chair exercises at least 20 minutes every day instead of 3-4 days a week

## 2023-11-03 ENCOUNTER — OFFICE VISIT (OUTPATIENT)
Dept: BARIATRICS/WEIGHT MGMT | Age: 53
End: 2023-11-03
Payer: COMMERCIAL

## 2023-11-03 VITALS
TEMPERATURE: 98.2 F | WEIGHT: 285.4 LBS | HEIGHT: 64 IN | SYSTOLIC BLOOD PRESSURE: 124 MMHG | HEART RATE: 82 BPM | DIASTOLIC BLOOD PRESSURE: 76 MMHG | BODY MASS INDEX: 48.73 KG/M2

## 2023-11-03 DIAGNOSIS — E66.01 MORBID OBESITY WITH BMI OF 45.0-49.9, ADULT (HCC): Primary | ICD-10-CM

## 2023-11-03 PROCEDURE — 99213 OFFICE O/P EST LOW 20 MIN: CPT | Performed by: SURGERY

## 2023-11-03 RX ORDER — PHENTERMINE HYDROCHLORIDE 37.5 MG/1
37.5 CAPSULE ORAL EVERY MORNING
Qty: 30 CAPSULE | Refills: 0 | Status: SHIPPED | OUTPATIENT
Start: 2023-11-03 | End: 2023-12-03

## 2023-11-04 ASSESSMENT — ENCOUNTER SYMPTOMS
PHOTOPHOBIA: 0
NAUSEA: 0
STRIDOR: 0
EYE DISCHARGE: 0
EYE REDNESS: 0
RHINORRHEA: 0
FACIAL SWELLING: 0
APNEA: 0
BACK PAIN: 0
BLOOD IN STOOL: 0
DIARRHEA: 0
SINUS PRESSURE: 0
COLOR CHANGE: 0
EYE PAIN: 0
CONSTIPATION: 0
CHOKING: 0
TROUBLE SWALLOWING: 0
CHEST TIGHTNESS: 0
ABDOMINAL PAIN: 0
ANAL BLEEDING: 0
EYE ITCHING: 0
SORE THROAT: 0
VOICE CHANGE: 0
ABDOMINAL DISTENTION: 0
WHEEZING: 0
VOMITING: 0
RECTAL PAIN: 0
ALLERGIC/IMMUNOLOGIC NEGATIVE: 1
SHORTNESS OF BREATH: 0
COUGH: 0

## 2023-11-04 NOTE — PROGRESS NOTES
Jose Venegasndria (:  1970)     ASSESSMENT:  1.  Morbid obesity (BMI 49)  2. Mitral valve prolapse  3. Sleep apnea  4. Heart palpitations    PLAN:  Behavior modification discussed in detail in regards to dietary habits. Nutritional education occurred during visit. Continue following recommendations per dietitian. Improvement in fitness/exercise discussed with patient and the need for this with/without surgery. EGD prior to any surgical intervention  Encouraged to attend support groups. Goal to lose 1-2# per week  Seca scale as needed  Continue tracking food intake  Encouraged 3 meals daily and 1 healthy snack in the evening. Avoid trigger foods. Encouraged kitchen clean out. Increase dedicated activity to at least 3 times per week. (Chair exercises due to hip/bursitis)  Decrease coffee intake  Will follow up in 1 month  Risks, benefits and possible side effects/adverse reactions discussed in detail with patient in regards to weight loss medications. Specifically discussed Qsymia/Adipex. All questions answered. She wishes to proceed with Adipex. First prescription given today. I spent 37 minutes with the patient, with greater that 50% of that time spent on education, counseling and coordination of care. SUBJECTIVE/OBJECTIVE:    Chief Complaint   Patient presents with    Weight Management     Wants to start diet meds     HPI  Alejandrina Dawkins is a 80-year-old female who presents for evaluation at the weight management clinic secondary to her morbid obesity. BMI 49. Current weight 285 pounds. She was seen by Helene Travis in the office about a month ago. At that time 283 pounds. She is interested in weight loss medications. She states she has thought about the surgery but at this time is trying to avoid it and do it naturally with improved nutrition, exercise and possibly weight loss medications. Admits she is not able to be that active due to right hip/bursitis issues. Doing chair exercises.   Hoping

## 2023-12-07 ENCOUNTER — OFFICE VISIT (OUTPATIENT)
Dept: BARIATRICS/WEIGHT MGMT | Age: 53
End: 2023-12-07
Payer: COMMERCIAL

## 2023-12-07 VITALS
TEMPERATURE: 97.3 F | BODY MASS INDEX: 47.53 KG/M2 | SYSTOLIC BLOOD PRESSURE: 124 MMHG | RESPIRATION RATE: 12 BRPM | HEART RATE: 70 BPM | HEIGHT: 64 IN | WEIGHT: 278.4 LBS | DIASTOLIC BLOOD PRESSURE: 78 MMHG

## 2023-12-07 PROCEDURE — 99213 OFFICE O/P EST LOW 20 MIN: CPT | Performed by: SURGERY

## 2023-12-07 RX ORDER — PHENTERMINE HYDROCHLORIDE 37.5 MG/1
37.5 CAPSULE ORAL EVERY MORNING
Qty: 30 CAPSULE | Refills: 0 | Status: SHIPPED | OUTPATIENT
Start: 2023-12-07 | End: 2024-01-06

## 2023-12-12 ASSESSMENT — ENCOUNTER SYMPTOMS
CONSTIPATION: 0
EYE ITCHING: 0
ABDOMINAL PAIN: 0
BACK PAIN: 0
RHINORRHEA: 0
VOMITING: 0
FACIAL SWELLING: 0
ANAL BLEEDING: 0
PHOTOPHOBIA: 0
CHEST TIGHTNESS: 0
WHEEZING: 0
EYE REDNESS: 0
APNEA: 0
ALLERGIC/IMMUNOLOGIC NEGATIVE: 1
VOICE CHANGE: 0
ABDOMINAL DISTENTION: 0
SINUS PRESSURE: 0
EYE DISCHARGE: 0
CHOKING: 0
COUGH: 0
TROUBLE SWALLOWING: 0
COLOR CHANGE: 0
DIARRHEA: 0
SHORTNESS OF BREATH: 0
BLOOD IN STOOL: 0
STRIDOR: 0
EYE PAIN: 0
NAUSEA: 0
RECTAL PAIN: 0
SORE THROAT: 0

## 2023-12-12 NOTE — PROGRESS NOTES
Jewel York (:  1970)     ASSESSMENT:  1.  Morbid obesity (BMI 47)  2. Mitral valve prolapse  3. Sleep apnea  4. Hx Heart palpitations    PLAN:  Behavior modification discussed again in regards to dietary habits. Nutritional education occurred during visit. Continue following recommendations per dietitian. Improvement in fitness/exercise discussed with patient and the need for this with/without surgery. EGD prior to any surgical intervention  Encouraged to attend support groups. Goal to lose 1-2# per week  Seca scale as needed  Continue tracking food intake  Encouraged 3 meals daily and 1 healthy snack in the evening. Avoid trigger foods. Increase dedicated activity to at least 3 times per week. (Chair exercises due to hip/bursitis)  Decrease coffee intake  Will follow up in 1 month  Risks, benefits and possible side effects/adverse reactions discussed again with patient in regards to weight loss medications. Specifically discussed Adipex. All questions answered. She wishes to proceed with Adipex. Second prescription given today. She is aware that she is in need 5% weight loss over the first 3 months usage of Adipex. I spent 35 minutes with the patient, with greater that 50% of that time spent on education, counseling and coordination of care. SUBJECTIVE/OBJECTIVE:    Chief Complaint   Patient presents with    Weight Management     Month 1 Adipex check up     HPI  Stanley Yates is a 52-year-old female who presents for follow-up at the weight management program secondary to her morbid obesity. BMI 47. Current weight 278 pounds. She has lost 7 pounds since last visit. Completed first month of Adipex. Doing well with it. Feels it is suppressing her appetite and doing what she was hoping it to do. Denies any headaches. No heart palpitations. No anxiety or agitation. No chest pain or shortness of breath. No abdominal pain. No urinary complaints. No hematochezia or melena.

## 2023-12-26 ENCOUNTER — OFFICE VISIT (OUTPATIENT)
Dept: BARIATRICS/WEIGHT MGMT | Age: 53
End: 2023-12-26

## 2023-12-26 VITALS — BODY MASS INDEX: 47.77 KG/M2 | HEIGHT: 64 IN | WEIGHT: 279.8 LBS

## 2023-12-26 NOTE — PATIENT INSTRUCTIONS
Goals: Focus on balance of the plate at meal times- include lean protein, fruits and vegetables  Aim for regular meal times. [id number]   Dinner- 5:30p-6:30p     Continue at least 64 oz of water a day greater- good job with this! My Fitness Pal for food logging lionel. Suggest ~ 1600 calories daily, 45% carbohydrate, 25% protein, and 30% fat  5. Exercise goal: Walk inside or outside at least 20-30 minutes every day.   Look into \"walk off the pounds with [employer]

## 2024-01-04 ENCOUNTER — INITIAL CONSULT (OUTPATIENT)
Dept: BARIATRICS/WEIGHT MGMT | Age: 54
End: 2024-01-04

## 2024-01-04 VITALS
TEMPERATURE: 96.8 F | DIASTOLIC BLOOD PRESSURE: 88 MMHG | SYSTOLIC BLOOD PRESSURE: 126 MMHG | WEIGHT: 273.6 LBS | HEART RATE: 76 BPM | HEIGHT: 64 IN | BODY MASS INDEX: 46.71 KG/M2

## 2024-01-04 RX ORDER — PHENTERMINE HYDROCHLORIDE 37.5 MG/1
37.5 CAPSULE ORAL EVERY MORNING
Qty: 30 CAPSULE | Refills: 0 | Status: SHIPPED | OUTPATIENT
Start: 2024-01-04 | End: 2024-02-03

## 2024-01-06 ASSESSMENT — ENCOUNTER SYMPTOMS
VOICE CHANGE: 0
DIARRHEA: 0
RHINORRHEA: 0
BACK PAIN: 0
SORE THROAT: 0
ALLERGIC/IMMUNOLOGIC NEGATIVE: 1
FACIAL SWELLING: 0
ANAL BLEEDING: 0
SINUS PRESSURE: 0
ABDOMINAL PAIN: 0
WHEEZING: 0
EYE ITCHING: 0
RECTAL PAIN: 0
CHOKING: 0
BLOOD IN STOOL: 0
CHEST TIGHTNESS: 0
EYE REDNESS: 0
APNEA: 0
ABDOMINAL DISTENTION: 0
STRIDOR: 0
CONSTIPATION: 0
TROUBLE SWALLOWING: 0
NAUSEA: 0
VOMITING: 0
EYE PAIN: 0
EYE DISCHARGE: 0
COUGH: 0
SHORTNESS OF BREATH: 0
PHOTOPHOBIA: 0
COLOR CHANGE: 0

## 2024-01-06 NOTE — PROGRESS NOTES
Thought Content: Thought content normal.         Judgment: Judgment normal.       Lab Results   Component Value Date    WBC 5.8 02/16/2023    HGB 14.5 02/16/2023    HCT 43.3 02/16/2023    MCV 84.6 02/16/2023     02/16/2023     Lab Results   Component Value Date     02/16/2023    K 3.6 02/16/2023     02/16/2023    CO2 24 02/16/2023     Lab Results   Component Value Date    CREATININE 0.6 02/16/2023     Lab Results   Component Value Date    ALT 24 02/16/2023    AST 18 02/16/2023    ALKPHOS 84 02/16/2023    BILITOT 0.4 02/16/2023     Lab Results   Component Value Date    LIPASE 20.6 02/16/2023       Patient Active Problem List   Diagnosis    Myopia of both eyes with astigmatism       An electronic signature was used to authenticate this note.    --Trey Dowd MD

## 2024-02-01 ENCOUNTER — INITIAL CONSULT (OUTPATIENT)
Dept: BARIATRICS/WEIGHT MGMT | Age: 54
End: 2024-02-01
Payer: COMMERCIAL

## 2024-02-01 VITALS
RESPIRATION RATE: 12 BRPM | DIASTOLIC BLOOD PRESSURE: 78 MMHG | BODY MASS INDEX: 46.23 KG/M2 | SYSTOLIC BLOOD PRESSURE: 122 MMHG | HEART RATE: 88 BPM | WEIGHT: 270.8 LBS | HEIGHT: 64 IN | TEMPERATURE: 97.8 F

## 2024-02-01 PROCEDURE — 99213 OFFICE O/P EST LOW 20 MIN: CPT | Performed by: SURGERY

## 2024-02-01 RX ORDER — PHENTERMINE HYDROCHLORIDE 37.5 MG/1
37.5 CAPSULE ORAL EVERY MORNING
Qty: 30 CAPSULE | Refills: 0 | Status: SHIPPED | OUTPATIENT
Start: 2024-02-01 | End: 2024-03-02

## 2024-02-01 RX ORDER — AMOXICILLIN 250 MG
1 CAPSULE ORAL DAILY
COMMUNITY

## 2024-02-05 ASSESSMENT — ENCOUNTER SYMPTOMS
TROUBLE SWALLOWING: 0
SORE THROAT: 0
CHOKING: 0
FACIAL SWELLING: 0
STRIDOR: 0
SHORTNESS OF BREATH: 0
WHEEZING: 0
NAUSEA: 0
ANAL BLEEDING: 0
RECTAL PAIN: 0
COLOR CHANGE: 0
SINUS PRESSURE: 0
EYE ITCHING: 0
VOICE CHANGE: 0
PHOTOPHOBIA: 0
EYE REDNESS: 0
RHINORRHEA: 0
ABDOMINAL DISTENTION: 0
CONSTIPATION: 1
DIARRHEA: 0
EYE DISCHARGE: 0
APNEA: 0
EYE PAIN: 0
BLOOD IN STOOL: 0
BACK PAIN: 0
ALLERGIC/IMMUNOLOGIC NEGATIVE: 1
COUGH: 0
ABDOMINAL PAIN: 0
VOMITING: 0
CHEST TIGHTNESS: 0

## 2024-02-05 NOTE — PROGRESS NOTES
Not on file     Social Determinants of Health     Financial Resource Strain: Not on file   Food Insecurity: Not on file   Transportation Needs: Not on file   Physical Activity: Not on file   Stress: Not on file   Social Connections: Not on file   Intimate Partner Violence: Not on file   Housing Stability: Not on file     Vitals:    02/01/24 1229   BP: 122/78   Site: Right Upper Arm   Position: Sitting   Cuff Size: Large Adult   Pulse: 88   Resp: 12   Temp: 97.8 °F (36.6 °C)   TempSrc: Oral   Weight: 122.8 kg (270 lb 12.8 oz)   Height: 1.626 m (5' 4\")     Body mass index is 46.48 kg/m².    Wt Readings from Last 3 Encounters:   02/01/24 122.8 kg (270 lb 12.8 oz)   01/04/24 124.1 kg (273 lb 9.6 oz)   12/26/23 126.9 kg (279 lb 12.8 oz)     Physical Exam  Vitals reviewed.   Constitutional:       General: She is not in acute distress.     Appearance: She is well-developed. She is not diaphoretic.   HENT:      Head: Normocephalic and atraumatic.      Right Ear: External ear normal.      Left Ear: External ear normal.      Nose: Nose normal.   Eyes:      General: No scleral icterus.        Right eye: No discharge.         Left eye: No discharge.      Conjunctiva/sclera: Conjunctivae normal.   Cardiovascular:      Rate and Rhythm: Normal rate and regular rhythm.      Heart sounds: Normal heart sounds.   Pulmonary:      Effort: Pulmonary effort is normal. No respiratory distress.      Breath sounds: Normal breath sounds. No wheezing or rales.   Chest:      Chest wall: No tenderness.   Abdominal:      General: Bowel sounds are normal. There is no distension.      Palpations: Abdomen is soft. There is no mass.      Tenderness: There is no abdominal tenderness. There is no guarding or rebound.   Musculoskeletal:         General: No tenderness. Normal range of motion.      Cervical back: Normal range of motion and neck supple.   Skin:     General: Skin is warm and dry.      Coloration: Skin is not pale.      Findings: No

## 2024-02-25 NOTE — PROGRESS NOTES
Assessment: Patient is a 47 y.o. female seen for  month three  follow up MNT visit for  medically supervised weight loss. Vitals from current and previous visits:        12/26/2023   Vitals    SYSTOLIC    DIASTOLIC    Site    Position    Cuff Size    Pulse    Temp    Respirations    SpO2    Weight - Scale 279 lb 12.8 oz    Height 5' 4\"    Body Mass Index 48.03 kg/m2 (H)       Initial weight at start of Weight Management Program was: 300 lbs     Shana lost 7 lbs from last RD visit  Weight up one pound from visit in office on 12/7/23  -Weight goal: lose weight.     -Nutritionally relevant labs: TG-264, glucose-103, TSH WNL  Lab Results   Component Value Date/Time    LABA1C 5.4 10/20/2012 08:44 AM    GLUCOSE 103 03/20/2023 05:18 AM    GLUCOSE 105 02/16/2023 02:27 PM    CHOL 194 03/20/2023 05:18 AM    HDL 39 03/20/2023 05:18 AM    LDLCALC 102 03/20/2023 05:18 AM    TRIG 264 (H) 03/20/2023 05:18 AM                  Lab Results   Component Value Date/Time    VITD25 19 10/20/2012 08:44 AM     Wearing CPAP   - Is patient taking daily Multivitamin:  Does not take a MVI  Adipex started 11/3/23 with weight of 285  lbs - pt aware of ~ 5% weight loss goal = 14 lbs    Work and Sleep Schedule: Works M-F 6a-4:30pm- works from home  Pt, spouse and daughter/boyfriend in [address] [facility] Recall:   Pt reports over the last week increase grazing with holidays and brenna parties. States with starting Adipex hasn't felt as hungry in evening after dinner meal and reduced snacking in evening. .   Denies skipping meals. Prefers to work on increasing vegetables in daily meal planning again. Main Beverages:   aims to keep at 1 cup coffee with sugar free creamer per day specifically with starting adipex, water -~ 2 gallons water per day or more. Does use [employer] Bottle with one flavor cartridge daily (no caffeine). No pop, no energy drinks.      -Impression of Dietary Intake:  requires to focus on balanced meals, portion sizes, and 25-Feb-2024 08:18

## 2024-03-19 NOTE — PATIENT INSTRUCTIONS
Your  Nurses  Today: Amie     Your Provider for Today: Dr. Long     You may receive a survey regarding the care you received during your visit.  Your input is valuable to us.  We encourage you to complete and return your survey.  We hope you will choose us in the future for your healthcare needs.

## 2024-03-20 ENCOUNTER — OFFICE VISIT (OUTPATIENT)
Dept: CARDIOLOGY CLINIC | Age: 54
End: 2024-03-20
Payer: COMMERCIAL

## 2024-03-20 VITALS
HEIGHT: 64 IN | BODY MASS INDEX: 46.61 KG/M2 | DIASTOLIC BLOOD PRESSURE: 90 MMHG | SYSTOLIC BLOOD PRESSURE: 132 MMHG | WEIGHT: 273 LBS | HEART RATE: 64 BPM

## 2024-03-20 DIAGNOSIS — R00.2 PALPITATION: Primary | ICD-10-CM

## 2024-03-20 PROCEDURE — 99214 OFFICE O/P EST MOD 30 MIN: CPT | Performed by: INTERNAL MEDICINE

## 2024-03-20 NOTE — PROGRESS NOTES
[facility] [place] [facility]  [facility] CARDIOLOGY  [address]  [facility] [address]  Dept: [phone]  Dept Fax: [phone]  Loc: [phone]    Visit Date: 3/20/2024    Ms. Peña is a 54 y.o. female  who presented for:  Chief Complaint   Patient presents with    Check-Up    Palpitations       HPI:   53 yo F c hx of MVP, BMI 46 is here for palpitations.  Has been having palpitations for last 4-6 months.   Had holter for 24 hr which did not show any arrhythmics.  She is currently following up with weight loss clinic.  Her symptoms have improved since being on metoprolol.           Current Outpatient Medications:     senna-docusate (PERICOLACE) 8.6-50 MG per tablet, Take 1 tablet by mouth daily as needed, Disp: , Rfl:     metoprolol tartrate (LOPRESSOR) 50 MG tablet, TAKE 1 TABLET BY MOUTH 2 TIMES DAILY, Disp: 60 tablet, Rfl: 7    Past Medical History  Shana  has a past medical history of Arrhythmia and Myopia.    Social History  Shana  reports that she has never smoked. She has never used smokeless tobacco. She reports that she does not drink alcohol and does not use drugs.    Family History  Shana family history includes Asthma in her brother; Breast Cancer (age of onset: 45) in her maternal grandfather; Cancer in her maternal grandmother and paternal grandmother; Diabetes in her father.    Past Surgical History   Past Surgical History:   Procedure Laterality Date    APPENDECTOMY       SECTION      TONSILLECTOMY         Subjective:     REVIEW OF SYSTEMS  Constitutional: denies sweats, chills and fever  HENT: denies  congestion, sinus pressure, sneezing and sore throat.    Eyes: denies  pain, discharge, redness and itching.   Respiratory: denies apnea, cough  Gastrointestinal: denies blood in stool, constipation, diarrhea   Endocrine: denies cold intolerance, heat intolerance, polydipsia.  Genitourinary: denies dysuria, enuresis, flank pain and hematuria.

## 2024-04-04 ENCOUNTER — OFFICE VISIT (OUTPATIENT)
Dept: BARIATRICS/WEIGHT MGMT | Age: 54
End: 2024-04-04
Payer: COMMERCIAL

## 2024-04-04 VITALS
HEART RATE: 68 BPM | DIASTOLIC BLOOD PRESSURE: 78 MMHG | BODY MASS INDEX: 46.92 KG/M2 | TEMPERATURE: 98 F | WEIGHT: 274.8 LBS | SYSTOLIC BLOOD PRESSURE: 122 MMHG | HEIGHT: 64 IN | RESPIRATION RATE: 12 BRPM

## 2024-04-04 DIAGNOSIS — E66.01 MORBID OBESITY WITH BMI OF 45.0-49.9, ADULT (HCC): Primary | ICD-10-CM

## 2024-04-04 PROCEDURE — 99214 OFFICE O/P EST MOD 30 MIN: CPT | Performed by: SURGERY

## 2024-04-04 RX ORDER — PHENTERMINE HYDROCHLORIDE 37.5 MG/1
37.5 CAPSULE ORAL EVERY MORNING
Qty: 30 CAPSULE | Refills: 0 | Status: SHIPPED | OUTPATIENT
Start: 2024-04-04 | End: 2024-05-04

## 2024-04-05 ASSESSMENT — ENCOUNTER SYMPTOMS
ABDOMINAL PAIN: 0
CHEST TIGHTNESS: 0
STRIDOR: 0
CONSTIPATION: 0
WHEEZING: 0
COLOR CHANGE: 0
RHINORRHEA: 0
APNEA: 0
EYE DISCHARGE: 0
BACK PAIN: 0
COUGH: 0
RECTAL PAIN: 0
DIARRHEA: 0
PHOTOPHOBIA: 0
EYE PAIN: 0
EYE REDNESS: 0
ANAL BLEEDING: 0
SINUS PRESSURE: 0
VOMITING: 0
BLOOD IN STOOL: 0
CHOKING: 0
EYE ITCHING: 0
ALLERGIC/IMMUNOLOGIC NEGATIVE: 1
SORE THROAT: 0
SHORTNESS OF BREATH: 0
FACIAL SWELLING: 0
ABDOMINAL DISTENTION: 0

## 2024-04-05 NOTE — PROGRESS NOTES
Strain: Not on file   Food Insecurity: Not on file   Transportation Needs: Not on file   Physical Activity: Not on file   Stress: Not on file   Social Connections: Not on file   Intimate Partner Violence: Not on file   Housing Stability: Not on file     Vitals:    04/04/24 1524   BP: 122/78   Site: Left Upper Arm   Position: Sitting   Cuff Size: Large Adult   Pulse: 68   Resp: 12   Temp: 98 °F (36.7 °C)   TempSrc: Oral   Weight: 124.6 kg (274 lb 12.8 oz)   Height: 1.626 m (5' 4\")     Body mass index is 47.17 kg/m².    Wt Readings from Last 3 Encounters:   04/04/24 124.6 kg (274 lb 12.8 oz)   03/20/24 123.8 kg (273 lb)   02/01/24 122.8 kg (270 lb 12.8 oz)     Physical Exam  Vitals reviewed.   Constitutional:       General: She is not in acute distress.     Appearance: She is well-developed. She is not diaphoretic.   HENT:      Head: Normocephalic and atraumatic.      Right Ear: External ear normal.      Left Ear: External ear normal.      Nose: Nose normal.   Eyes:      General: No scleral icterus.        Right eye: No discharge.         Left eye: No discharge.      Conjunctiva/sclera: Conjunctivae normal.   Cardiovascular:      Rate and Rhythm: Normal rate and regular rhythm.      Heart sounds: Normal heart sounds.   Pulmonary:      Effort: Pulmonary effort is normal. No respiratory distress.      Breath sounds: Normal breath sounds. No wheezing or rales.   Chest:      Chest wall: No tenderness.   Abdominal:      General: Bowel sounds are normal. There is no distension.      Palpations: Abdomen is soft. There is no mass.      Tenderness: There is no abdominal tenderness. There is no guarding or rebound.   Musculoskeletal:         General: No tenderness. Normal range of motion.      Cervical back: Normal range of motion and neck supple.   Skin:     General: Skin is warm and dry.      Coloration: Skin is not pale.      Findings: No erythema or rash.   Neurological:      Mental Status: She is alert and oriented to

## 2024-04-11 ENCOUNTER — OFFICE VISIT (OUTPATIENT)
Dept: PULMONOLOGY | Age: 54
End: 2024-04-11
Payer: COMMERCIAL

## 2024-04-11 VITALS
OXYGEN SATURATION: 97 % | DIASTOLIC BLOOD PRESSURE: 82 MMHG | SYSTOLIC BLOOD PRESSURE: 126 MMHG | WEIGHT: 283 LBS | BODY MASS INDEX: 48.32 KG/M2 | HEART RATE: 87 BPM | TEMPERATURE: 97.7 F | HEIGHT: 64 IN

## 2024-04-11 DIAGNOSIS — E66.01 MORBID OBESITY WITH BMI OF 45.0-49.9, ADULT (HCC): ICD-10-CM

## 2024-04-11 DIAGNOSIS — G47.33 OSA ON CPAP: Primary | ICD-10-CM

## 2024-04-11 PROCEDURE — 99214 OFFICE O/P EST MOD 30 MIN: CPT | Performed by: NURSE PRACTITIONER

## 2024-04-11 ASSESSMENT — ENCOUNTER SYMPTOMS
COUGH: 0
WHEEZING: 0
EYES NEGATIVE: 1
GASTROINTESTINAL NEGATIVE: 1
RESPIRATORY NEGATIVE: 1
SHORTNESS OF BREATH: 0
ALLERGIC/IMMUNOLOGIC NEGATIVE: 1

## 2024-04-11 NOTE — PROGRESS NOTES
Musculoskeletal: Negative.    Allergic/Immunologic: Negative.    Neurological: Negative.    Hematological: Negative.    Psychiatric/Behavioral: Negative.  Negative for sleep disturbance.         Physical Exam:    BMI:  Body mass index is 48.58 kg/m².    Wt Readings from Last 3 Encounters:   04/11/24 128.4 kg (283 lb)   04/04/24 124.6 kg (274 lb 12.8 oz)   03/20/24 123.8 kg (273 lb)     Vitals: /82 (Site: Right Lower Arm, Position: Sitting, Cuff Size: Medium Adult)   Pulse 87   Temp 97.7 °F (36.5 °C) (Skin)   Ht 1.626 m (5' 4\")   Wt 128.4 kg (283 lb)   SpO2 97% Comment: Patient on room air  BMI 48.58 kg/m²       Physical Exam  Vitals and nursing note reviewed.   Constitutional:       Appearance: She is morbidly obese.   HENT:      Head: Normocephalic and atraumatic.   Eyes:      Conjunctiva/sclera: Conjunctivae normal.      Pupils: Pupils are equal, round, and reactive to light.   Neck:      Vascular: No JVD.   Cardiovascular:      Rate and Rhythm: Normal rate and regular rhythm.      Heart sounds: Normal heart sounds. No murmur heard.     No friction rub. No gallop.   Pulmonary:      Effort: Pulmonary effort is normal. No respiratory distress.      Breath sounds: Normal breath sounds. No wheezing or rales.   Abdominal:      General: Bowel sounds are normal.      Palpations: Abdomen is soft.   Musculoskeletal:         General: Normal range of motion.      Cervical back: Normal range of motion and neck supple.   Skin:     General: Skin is warm and dry.      Capillary Refill: Capillary refill takes less than 2 seconds.   Neurological:      Mental Status: She is alert and oriented to person, place, and time.   Psychiatric:         Behavior: Behavior normal.         Thought Content: Thought content normal.         Judgment: Judgment normal.       ASSESSMENT/DIAGNOSIS     Diagnosis Orders   1. ISA on CPAP  DME Order for CPAP as OP      2. Morbid obesity with BMI of 45.0-49.9, adult ([facility])             Plan

## 2024-05-02 ENCOUNTER — OFFICE VISIT (OUTPATIENT)
Dept: BARIATRICS/WEIGHT MGMT | Age: 54
End: 2024-05-02
Payer: COMMERCIAL

## 2024-05-02 VITALS
TEMPERATURE: 97.4 F | HEART RATE: 60 BPM | SYSTOLIC BLOOD PRESSURE: 122 MMHG | WEIGHT: 274 LBS | BODY MASS INDEX: 46.78 KG/M2 | HEIGHT: 64 IN | DIASTOLIC BLOOD PRESSURE: 82 MMHG

## 2024-05-02 PROCEDURE — 99214 OFFICE O/P EST MOD 30 MIN: CPT | Performed by: SURGERY

## 2024-05-02 RX ORDER — PHENTERMINE HYDROCHLORIDE 37.5 MG/1
37.5 CAPSULE ORAL EVERY MORNING
Qty: 30 CAPSULE | Refills: 0 | Status: SHIPPED | OUTPATIENT
Start: 2024-05-02 | End: 2024-06-01

## 2024-05-03 ASSESSMENT — ENCOUNTER SYMPTOMS
EYE ITCHING: 0
TROUBLE SWALLOWING: 0
FACIAL SWELLING: 0
ALLERGIC/IMMUNOLOGIC NEGATIVE: 1
ABDOMINAL DISTENTION: 0
EYE REDNESS: 0
CONSTIPATION: 0
VOMITING: 0
SINUS PRESSURE: 0
PHOTOPHOBIA: 0
COUGH: 0
BLOOD IN STOOL: 0
VOICE CHANGE: 0
RECTAL PAIN: 0
COLOR CHANGE: 0
RHINORRHEA: 0
NAUSEA: 0
APNEA: 0
SHORTNESS OF BREATH: 0
CHEST TIGHTNESS: 0
STRIDOR: 0
CHOKING: 0
EYE PAIN: 0
WHEEZING: 0
BACK PAIN: 0
SORE THROAT: 0
DIARRHEA: 0
EYE DISCHARGE: 0
ABDOMINAL PAIN: 0
ANAL BLEEDING: 0

## 2024-05-03 NOTE — PROGRESS NOTES
Shana Peña (:  1970)     ASSESSMENT:  1.  Morbid obesity (BMI 47)  2.  Mitral valve prolapse  3.  Sleep apnea  4.  Hx Heart palpitations    PLAN:  Behavior modification discussed again in regards to dietary habits.  Nutritional education occurred during visit. Continue following recommendations per dietitian.  Encouraged to follow-up with dietitian.  Improvement in fitness/exercise discussed with patient and the need for this with/without surgery.  Encouraged fitness center.  Fitness orientation.  EGD prior to any surgical intervention  Encouraged to attend support groups.  Goal to lose 1-2# per week  Seca scale as needed  Continue tracking food intake  Encouraged 3 meals daily and 1 healthy snack in the evening.   Avoid trigger foods.   Increase dedicated activity to at least 3 times per week.   Will follow up in 1 month  Risks, benefits and possible side effects/adverse reactions discussed again with patient in regards to weight loss medications.  Specifically again discussed Adipex.  All questions answered.  She wishes to proceed with second month of Adipex.  Prescription given today.  She had lost the needed 5% weight loss over the first 3 months usage of Adipex.  And then was off of Adipex for 2 months.     I spent 33 minutes with the patient, with greater that 50% of that time spent on education, counseling and coordination of care    SUBJECTIVE/OBJECTIVE:    Chief Complaint   Patient presents with    Weight Management     5 month Adipex follow up     HPI  Shana is a 54-year-old female who presents for follow-up at the weight management program secondary to her morbid obesity.  BMI 47.  Current weight 274 pounds.  She has stayed the same weight over the last month.  The first 3 months she was able to drop 15 pounds with Adipex.  She was then off of it for 2 months due to scheduling conflicts.  She resumed it last month.  She did not lose any weight but feels it is being effective with appetite

## 2024-06-06 ENCOUNTER — OFFICE VISIT (OUTPATIENT)
Dept: BARIATRICS/WEIGHT MGMT | Age: 54
End: 2024-06-06
Payer: COMMERCIAL

## 2024-06-06 VITALS
HEIGHT: 64 IN | SYSTOLIC BLOOD PRESSURE: 122 MMHG | DIASTOLIC BLOOD PRESSURE: 78 MMHG | BODY MASS INDEX: 46.33 KG/M2 | HEART RATE: 64 BPM | RESPIRATION RATE: 12 BRPM | TEMPERATURE: 98.1 F | WEIGHT: 271.4 LBS

## 2024-06-06 PROCEDURE — 99213 OFFICE O/P EST LOW 20 MIN: CPT | Performed by: SURGERY

## 2024-06-06 RX ORDER — PHENTERMINE HYDROCHLORIDE 37.5 MG/1
37.5 CAPSULE ORAL EVERY MORNING
Qty: 30 CAPSULE | Refills: 0 | Status: SHIPPED | OUTPATIENT
Start: 2024-06-06 | End: 2024-07-06

## 2024-06-06 RX ORDER — PHENTERMINE HYDROCHLORIDE 37.5 MG/1
37.5 CAPSULE ORAL
COMMUNITY

## 2024-06-07 ASSESSMENT — ENCOUNTER SYMPTOMS
CHOKING: 0
VOICE CHANGE: 0
DIARRHEA: 0
EYE ITCHING: 0
RECTAL PAIN: 0
WHEEZING: 0
SORE THROAT: 0
COUGH: 0
STRIDOR: 0
PHOTOPHOBIA: 0
NAUSEA: 0
SHORTNESS OF BREATH: 0
TROUBLE SWALLOWING: 0
CONSTIPATION: 0
EYE DISCHARGE: 0
EYE PAIN: 0
FACIAL SWELLING: 0
BLOOD IN STOOL: 0
ABDOMINAL PAIN: 0
COLOR CHANGE: 0
APNEA: 0
BACK PAIN: 0
SINUS PRESSURE: 0
CHEST TIGHTNESS: 0
EYE REDNESS: 0
RHINORRHEA: 0
ABDOMINAL DISTENTION: 0
ANAL BLEEDING: 0
VOMITING: 0
ALLERGIC/IMMUNOLOGIC NEGATIVE: 1

## 2024-06-07 NOTE — PROGRESS NOTES
Shana Peña (:  1970)     ASSESSMENT:  1.  Morbid obesity (BMI 46)  2.  Mitral valve prolapse  3.  Sleep apnea  4.  Hx Heart palpitations    PLAN:  Behavior modification discussed again in regards to dietary habits.  Nutritional education occurred during visit. Continue following recommendations per dietitian.  Encouraged to follow-up with dietitian.  Improvement in fitness/exercise discussed with patient and the need for this with/without surgery.  Encouraged fitness center.  Fitness orientation.  EGD prior to any surgical intervention  Encouraged to attend support groups.  Goal to lose 1-2# per week  Seca scale as needed  Continue tracking food intake  Avoid trigger foods.   Increase dedicated activity to at least 3 times per week.   Will follow up in 1 month  Risks, benefits and possible side effects/adverse reactions discussed again with patient in regards to weight loss medications.  Specifically again discussed Adipex.  All questions answered.  She wishes to proceed with another month of Adipex.  Prescription given today.  She had lost the needed 5% weight loss over the first 3 months usage of Adipex.  And then was off of Adipex for 2 months.  Another prescription given today.     I spent 29 minutes with the patient, with greater that 50% of that time spent on education, counseling and coordination of care    SUBJECTIVE/OBJECTIVE:    Chief Complaint   Patient presents with    Weight Management     Mo 6 Adipex follow up visit     HPI  Shana is a 54-year-old female who presents for follow-up at the weight management program secondary to her morbid obesity.  BMI 46.  Current weight 271 pounds.  She lost 3 pounds over the last month.  She originally had been on it for 3 months and was able to lose 15 pounds.  Due to scheduling issues we missed a few months but then she started back up again and is doing well.  3 pounds over the last 2 months.  She was hoping to proceed with the third month

## 2024-07-05 ENCOUNTER — OFFICE VISIT (OUTPATIENT)
Dept: BARIATRICS/WEIGHT MGMT | Age: 54
End: 2024-07-05
Payer: COMMERCIAL

## 2024-07-05 VITALS
BODY MASS INDEX: 46.26 KG/M2 | WEIGHT: 271 LBS | TEMPERATURE: 97.2 F | HEART RATE: 61 BPM | HEIGHT: 64 IN | SYSTOLIC BLOOD PRESSURE: 126 MMHG | DIASTOLIC BLOOD PRESSURE: 84 MMHG

## 2024-07-05 PROCEDURE — 99213 OFFICE O/P EST LOW 20 MIN: CPT | Performed by: SURGERY

## 2024-07-05 RX ORDER — PHENTERMINE HYDROCHLORIDE 37.5 MG/1
37.5 CAPSULE ORAL EVERY MORNING
Qty: 30 CAPSULE | Refills: 0 | Status: SHIPPED | OUTPATIENT
Start: 2024-07-05 | End: 2024-08-04

## 2024-07-06 ASSESSMENT — ENCOUNTER SYMPTOMS
ANAL BLEEDING: 0
STRIDOR: 0
COUGH: 0
ABDOMINAL DISTENTION: 0
BLOOD IN STOOL: 0
PHOTOPHOBIA: 0
NAUSEA: 0
EYE ITCHING: 0
RECTAL PAIN: 0
EYE PAIN: 0
TROUBLE SWALLOWING: 0
BACK PAIN: 0
SHORTNESS OF BREATH: 0
FACIAL SWELLING: 0
APNEA: 0
SORE THROAT: 0
CONSTIPATION: 0
EYE DISCHARGE: 0
DIARRHEA: 0
EYE REDNESS: 0
VOICE CHANGE: 0
CHEST TIGHTNESS: 0
ALLERGIC/IMMUNOLOGIC NEGATIVE: 1
SINUS PRESSURE: 0
COLOR CHANGE: 0
CHOKING: 0
WHEEZING: 0
ABDOMINAL PAIN: 0
VOMITING: 0
RHINORRHEA: 0

## 2024-07-06 NOTE — PROGRESS NOTES
Shana Peña (:  1970)     ASSESSMENT:  1.  Morbid obesity (BMI 46)  2.  Mitral valve prolapse  3.  Sleep apnea  4.  Hx Heart palpitations    PLAN:  Behavior modification discussed again in regards to dietary habits.  Nutritional education occurred during visit. Continue following recommendations per dietitian.  Encouraged to follow-up with dietitian.  Improvement in fitness/exercise discussed with patient and the need for this with/without surgery.  Encouraged fitness center.  Fitness orientation.  EGD prior to any surgical intervention  Encouraged to attend support groups.  Goal to lose 1-2# per week  Seca scale as needed  Continue tracking food intake  Avoid trigger foods.   Increase dedicated activity to at least 3 times per week.   Will follow up in 1 month  Risks, benefits and possible side effects/adverse reactions discussed again with patient in regards to weight loss medications.  Specifically again discussed Adipex.  All questions answered.  She wishes to proceed with another month of Adipex.  Prescription given today.  She had lost the needed 5% weight loss over the first 3 months usage of Adipex.  And then was off of Adipex for 2 months.  Another prescription given today.     I spent 27 minutes with the patient, with greater that 50% of that time spent on education, counseling and coordination of care    SUBJECTIVE/OBJECTIVE:    Chief Complaint   Patient presents with    Weight Management     Adipex follow up     HPI  Shana is a 54-year-old female who presents for follow-up at the weight management program secondary to her morbid obesity.  BMI 46.  Current weight 271 pounds.  She did not lose any weight over the last month.  She admits being down about another 4 pounds but then gained the 4 pounds back she states during vacation.  [place] a few things done at home.  She originally had been on it for 3 months and was able to lose

## 2024-08-01 ENCOUNTER — INITIAL CONSULT (OUTPATIENT)
Dept: BARIATRICS/WEIGHT MGMT | Age: 54
End: 2024-08-01
Payer: COMMERCIAL

## 2024-08-01 VITALS
HEART RATE: 68 BPM | TEMPERATURE: 97.7 F | HEIGHT: 64 IN | WEIGHT: 270.6 LBS | BODY MASS INDEX: 46.2 KG/M2 | SYSTOLIC BLOOD PRESSURE: 130 MMHG | DIASTOLIC BLOOD PRESSURE: 72 MMHG

## 2024-08-01 PROCEDURE — 99213 OFFICE O/P EST LOW 20 MIN: CPT | Performed by: SURGERY

## 2024-08-01 RX ORDER — PHENTERMINE HYDROCHLORIDE 37.5 MG/1
37.5 CAPSULE ORAL EVERY MORNING
Qty: 30 CAPSULE | Refills: 0 | Status: SHIPPED | OUTPATIENT
Start: 2024-08-01 | End: 2024-08-31

## 2024-08-02 ASSESSMENT — ENCOUNTER SYMPTOMS
STRIDOR: 0
ANAL BLEEDING: 0
BACK PAIN: 0
DIARRHEA: 0
ABDOMINAL PAIN: 0
COUGH: 0
VOICE CHANGE: 0
EYE DISCHARGE: 0
COLOR CHANGE: 0
VOMITING: 0
EYE PAIN: 0
CONSTIPATION: 0
RHINORRHEA: 0
WHEEZING: 0
APNEA: 0
FACIAL SWELLING: 0
RECTAL PAIN: 0
EYE ITCHING: 0
TROUBLE SWALLOWING: 0
SORE THROAT: 0
NAUSEA: 0
EYE REDNESS: 0
CHEST TIGHTNESS: 0
SINUS PRESSURE: 0
CHOKING: 0
ABDOMINAL DISTENTION: 0
PHOTOPHOBIA: 0
BLOOD IN STOOL: 0
ALLERGIC/IMMUNOLOGIC NEGATIVE: 1
SHORTNESS OF BREATH: 0

## 2024-08-02 NOTE — PROGRESS NOTES
Shana Peña (:  1970)     ASSESSMENT:  1.  Morbid obesity (BMI 46)  2.  Mitral valve prolapse  3.  Sleep apnea  4.  Hx Heart palpitations    PLAN:  Behavior modification discussed again in regards to dietary habits.  Nutritional education occurred during visit. Continue following recommendations per dietitian.  Encouraged to follow-up with dietitian.  Improvement in fitness/exercise discussed with patient and the need for this with/without surgery.  Encouraged fitness center.  Fitness orientation.  EGD prior to any surgical intervention  Encouraged to attend support groups.  Goal to lose 1-2# per week  Seca scale as needed  Continue tracking food intake  Avoid trigger foods.   Increase dedicated activity to at least 3 times per week.   Will follow up in 1 month  Risks, benefits and possible side effects/adverse reactions discussed again with patient in regards to weight loss medications.  Specifically again discussed Adipex.  All questions answered.  She wishes to proceed with another month of Adipex.  Prescription given today.  She had lost the needed 5% weight loss over the first 3 months usage of Adipex.  And then was off of Adipex for 2 months.  Another prescription given today which is now her fourth prescription again.  This time around she is only lost 4 pounds since resuming it.     I spent 29 minutes with the patient, with greater that 50% of that time spent on education, counseling and coordination of care    SUBJECTIVE/OBJECTIVE:    Chief Complaint   Patient presents with    Follow-up     Adipex follow-up     HPI  Shana is a 54-year-old female who presents for follow-up at the weight management program secondary to her morbid obesity.  BMI 46.  Current weight 270 pounds.  She lost 1 pound over the last month.  Her first 3 months of being on the medication she lost about 15 pounds.  She then stopped for a few months.  She has now lost 4 pounds over the last 3 months starting it up again.

## 2024-08-13 DIAGNOSIS — E66.9 OBESITY (BMI 30-39.9): Primary | ICD-10-CM

## 2024-08-13 RX ORDER — PHENTERMINE HYDROCHLORIDE 37.5 MG/1
37.5 CAPSULE ORAL EVERY MORNING
Qty: 30 CAPSULE | Refills: 0 | Status: SHIPPED | OUTPATIENT
Start: 2024-08-13 | End: 2024-09-12

## 2024-09-05 ENCOUNTER — OFFICE VISIT (OUTPATIENT)
Dept: BARIATRICS/WEIGHT MGMT | Age: 54
End: 2024-09-05
Payer: COMMERCIAL

## 2024-09-05 VITALS
BODY MASS INDEX: 46.54 KG/M2 | HEART RATE: 64 BPM | HEIGHT: 64 IN | TEMPERATURE: 98.5 F | SYSTOLIC BLOOD PRESSURE: 124 MMHG | WEIGHT: 272.6 LBS | DIASTOLIC BLOOD PRESSURE: 72 MMHG | RESPIRATION RATE: 12 BRPM

## 2024-09-05 DIAGNOSIS — G47.33 OSA ON CPAP: ICD-10-CM

## 2024-09-05 PROCEDURE — 99213 OFFICE O/P EST LOW 20 MIN: CPT | Performed by: SURGERY

## 2024-09-06 ASSESSMENT — ENCOUNTER SYMPTOMS
CONSTIPATION: 0
CHOKING: 0
APNEA: 0
ABDOMINAL DISTENTION: 0
FACIAL SWELLING: 0
STRIDOR: 0
SORE THROAT: 0
EYE PAIN: 0
RHINORRHEA: 0
BACK PAIN: 0
BLOOD IN STOOL: 0
EYE DISCHARGE: 0
WHEEZING: 0
EYE ITCHING: 0
ALLERGIC/IMMUNOLOGIC NEGATIVE: 1
PHOTOPHOBIA: 0
COUGH: 0
VOMITING: 0
RECTAL PAIN: 0
SINUS PRESSURE: 0
NAUSEA: 0
COLOR CHANGE: 0
CHEST TIGHTNESS: 0
ABDOMINAL PAIN: 0
VOICE CHANGE: 0
TROUBLE SWALLOWING: 0
SHORTNESS OF BREATH: 0
ANAL BLEEDING: 0
EYE REDNESS: 0
DIARRHEA: 0

## 2024-09-06 NOTE — PROGRESS NOTES
Shana Peña (:  1970)     ASSESSMENT:  1.  Morbid obesity (BMI 46)  2.  Mitral valve prolapse  3.  Sleep apnea  4.  Hx Heart palpitation    PLAN:  Behavior modification discussed again in regards to dietary habits.  Nutritional education occurred during visit. Continue following recommendations per dietitian.  Encouraged to follow-up with dietitian.  Improvement in fitness/exercise discussed with patient and the need for this with/without surgery.  Encouraged fitness center.  Fitness orientation.  EGD prior to any surgical intervention  Encouraged to attend support groups.  Goal to lose 1-2# per week  Seca scale as needed  Continue tracking food intake  Avoid trigger foods.   Increase dedicated activity to at least 3 times per week.   Will follow up in 1 month  Risks, benefits and possible side effects/adverse reactions discussed again with patient in regards to weight loss medications.  She really has not been able to lose much weight with Adipex.  Discussed option of Qsymia but at this point patient is in agreement that medications really do not seem to be doing well.  Further discussed possible surgical intervention.  Pros and cons of surgery discussed.  She would like to discuss it further with her  and if interested will call back for initial visit to proceed further down the road with sleeve gastrectomy/gastric bypass.     I spent 31 minutes with the patient, with greater that 50% of that time spent on education, counseling and coordination of care    SUBJECTIVE/OBJECTIVE:    Chief Complaint   Patient presents with    Weight Management     Pt wt up 2.6 lb since  ov     CURTIS  Shana is a 54-year-old female who presents for follow-up at the weight management program secondary to her morbid obesity.  BMI 46.  Current weight 272 pounds.  She is up 2 pounds from last visit.  She has lost a total of about 13 pounds since starting weight loss medications in 2023.  She really has

## 2024-12-10 NOTE — PROGRESS NOTES
Pt here for 1 yr check up     Pt continues heart palpitations, once a month        Exclude Pending Lab, Cardiology, and Radiology orders from printing on the Patient's Discharge Instructions, due to Privacy Concerns.

## 2025-01-29 NOTE — PATIENT INSTRUCTIONS
Goals:  Great job on changes made and portion sizes! Nutrition Goal:  I will continue to  use my food journal to record meal times, serving sizes and bring back to next dietitian visit  Water Goal:  Continue water intake at least two gallons per day  Exercise Goal: I will make sure walking outside every other day for 1/2-1 mile.
Offered, feeding was successful

## 2025-03-25 NOTE — PATIENT INSTRUCTIONS
Your Provider for Today: Dr. Long  Your nurses for today: Amie    You may receive a survey regarding the care you received during your visit.  Your input is valuable to us.  We encourage you to complete and return your survey.  We hope you will choose us in the future for your healthcare needs.

## 2025-03-26 ENCOUNTER — OFFICE VISIT (OUTPATIENT)
Dept: CARDIOLOGY CLINIC | Age: 55
End: 2025-03-26
Payer: COMMERCIAL

## 2025-03-26 VITALS
SYSTOLIC BLOOD PRESSURE: 138 MMHG | BODY MASS INDEX: 50.02 KG/M2 | HEIGHT: 64 IN | DIASTOLIC BLOOD PRESSURE: 68 MMHG | WEIGHT: 293 LBS | HEART RATE: 79 BPM

## 2025-03-26 DIAGNOSIS — R00.2 PALPITATION: Primary | ICD-10-CM

## 2025-03-26 DIAGNOSIS — I10 PRIMARY HYPERTENSION: ICD-10-CM

## 2025-03-26 PROCEDURE — 93000 ELECTROCARDIOGRAM COMPLETE: CPT | Performed by: INTERNAL MEDICINE

## 2025-03-26 PROCEDURE — 3078F DIAST BP <80 MM HG: CPT | Performed by: INTERNAL MEDICINE

## 2025-03-26 PROCEDURE — 99214 OFFICE O/P EST MOD 30 MIN: CPT | Performed by: INTERNAL MEDICINE

## 2025-03-26 PROCEDURE — 3075F SYST BP GE 130 - 139MM HG: CPT | Performed by: INTERNAL MEDICINE

## 2025-03-26 RX ORDER — METOPROLOL TARTRATE 50 MG
50 TABLET ORAL 2 TIMES DAILY
Qty: 180 TABLET | Refills: 3 | Status: CANCELLED | OUTPATIENT
Start: 2025-03-26

## 2025-03-26 RX ORDER — METOPROLOL SUCCINATE 50 MG/1
50 TABLET, EXTENDED RELEASE ORAL DAILY
Qty: 30 TABLET | Refills: 3 | Status: SHIPPED | OUTPATIENT
Start: 2025-03-26

## 2025-03-26 NOTE — PROGRESS NOTES
[facility]  [facility]  [address]  [facility] [address]  Dept: [phone]  Dept Fax: [phone]  Loc: [phone]    Visit Date: 3/26/2025    Ms. Peña is a 55 y.o. female  who presented for:    HPI:   54 yo F c hx of MVP, BMI 46, palpitation is here for a follow up.  She occasional palpitations  Had holter for 24 hr which did not show any arrhythmics.  She is currently following up with weight loss clinic.  Her symptoms have improved since being on metoprolol.   She is currently on metoprolol 50mg BID.          Current Outpatient Medications:     metoprolol succinate (TOPROL XL) 50 MG extended release tablet, Take 1 tablet by mouth daily, Disp: 30 tablet, Rfl: 3    metoprolol tartrate (LOPRESSOR) 50 MG tablet, TAKE 1 TABLET BY MOUTH 2 TIMES DAILY, Disp: 60 tablet, Rfl: 0    Past Medical History  Shana  has a past medical history of Arrhythmia and Myopia.    Social History  Shana  reports that she has never smoked. She has never used smokeless tobacco. She reports that she does not drink alcohol and does not use drugs.    Family History  Shana family history includes Asthma in her brother; Breast Cancer (age of onset: 45) in her maternal grandfather; Cancer in her maternal grandmother and paternal grandmother; Diabetes in her father.    Past Surgical History   Past Surgical History:   Procedure Laterality Date    APPENDECTOMY       SECTION      TONSILLECTOMY         Subjective:     REVIEW OF SYSTEMS  Constitutional: denies sweats, chills and fever  HENT: denies  congestion, sinus pressure, sneezing and sore throat.    Eyes: denies  pain, discharge, redness and itching.   Respiratory: denies apnea, cough  Gastrointestinal: denies blood in stool, constipation, diarrhea   Endocrine: denies cold intolerance, heat intolerance, polydipsia.  Genitourinary: denies dysuria, enuresis, flank pain and hematuria.   Musculoskeletal: denies arthralgias,

## 2025-03-26 NOTE — PROGRESS NOTES
Pt here for 1 yr check up     EKG done today     Pt continues with heart palpitations , not as often

## 2025-05-01 ENCOUNTER — OFFICE VISIT (OUTPATIENT)
Age: 55
End: 2025-05-01
Payer: COMMERCIAL

## 2025-05-01 VITALS
SYSTOLIC BLOOD PRESSURE: 118 MMHG | OXYGEN SATURATION: 98 % | HEART RATE: 76 BPM | WEIGHT: 293 LBS | TEMPERATURE: 98 F | HEIGHT: 64 IN | BODY MASS INDEX: 50.02 KG/M2 | DIASTOLIC BLOOD PRESSURE: 62 MMHG

## 2025-05-01 DIAGNOSIS — E66.01 MORBID OBESITY WITH BMI OF 50.0-59.9, ADULT (HCC): ICD-10-CM

## 2025-05-01 DIAGNOSIS — G47.33 OSA ON CPAP: Primary | ICD-10-CM

## 2025-05-01 PROCEDURE — 99214 OFFICE O/P EST MOD 30 MIN: CPT

## 2025-05-01 NOTE — PROGRESS NOTES
[facility]      Shana Peña         [id number]  5/1/2025   Chief Complaint   Patient presents with    Follow-up     1 year ISA f/u with [facility]         Pt of Dr. Lafleur and YULY Ac - CNP    Assessment/Plan   1. ISA on CPAP  -DME order for CPAP as OP  -Download reviewed and discussed with patient.  -The symptoms of ISA have improved. The patient is benefiting from therapy and should continue use of PAP device. I have reviewed the download.   -Advised to continue current positive airway pressure therapy with above described pressure.   -Advised to keep good compliance with current recommended pressure to get optimal results and clinical improvement  -Recommend 7-9 hours of sleep with PAP  -Instructed to call DME company regarding supplies if needed.   -Patient to call my office for earlier appointment if needed for worsening of sleep symptoms.   -Patient is not to drive if feeling sleepy    2. Morbid obesity with BMI of 50.0-59.9, adult (HCC)  -Counseled patient on weight loss        Educated about my impression and plan. Patient verbalizes understanding.    Return in about 1 year (around 5/1/2026) for ISA. with download.    Subjective     PAP Download:   Original or initial AHI: 7.9     Date of initial study: 7/16/2023    Set up on current machine: 2023    Compliant  97%     Noncompliant 3 %     PAP Type Autoset CPAP Level  Min 5 cmH2O Max 15 cmH2O  Avg Hrs/Day 6  hours and 52 minutes  AHI: 1.7   Recorded compliance dates: 03/31/2025 to 04/29/2025  Machine/Mfg:   [x] ResMed    [] Respironics/Dreamstation   Interface:   [] Nasal    [] Nasal pillows   [x] FFM      Provider:      [x] [facility]     [][facility]     [] [facility]    [] [facility]    [] [facility]               [] [facility]      [] [facility]    [] [place]:      [] Other    Neck Size: 17  Mallampati 4  ESS:  0  SAQLI: 97                Presentation:   Shana presents for sleep medicine follow up for

## 2025-05-07 RX ORDER — METOPROLOL SUCCINATE 50 MG/1
50 TABLET, EXTENDED RELEASE ORAL DAILY
Qty: 90 TABLET | Refills: 3 | Status: SHIPPED | OUTPATIENT
Start: 2025-05-07

## 2025-05-07 NOTE — TELEPHONE ENCOUNTER
Shana Peña called requesting a refill on the following medications:  Requested Prescriptions     Pending Prescriptions Disp Refills    metoprolol succinate (TOPROL XL) 50 MG extended release tablet 30 tablet 3     Sig: Take 1 tablet by mouth daily     Pharmacy verified:[facility]   .pv      Date of last visit: 3/26/25   Date of next visit (if applicable): Visit date not found